# Patient Record
Sex: FEMALE | Race: BLACK OR AFRICAN AMERICAN | Employment: FULL TIME | URBAN - METROPOLITAN AREA
[De-identification: names, ages, dates, MRNs, and addresses within clinical notes are randomized per-mention and may not be internally consistent; named-entity substitution may affect disease eponyms.]

---

## 2017-09-15 ENCOUNTER — TELEPHONE (OUTPATIENT)
Dept: SURGERY | Age: 37
End: 2017-09-15

## 2017-09-15 ENCOUNTER — OFFICE VISIT (OUTPATIENT)
Dept: SURGERY | Age: 37
End: 2017-09-15

## 2017-09-15 VITALS
WEIGHT: 211 LBS | DIASTOLIC BLOOD PRESSURE: 78 MMHG | BODY MASS INDEX: 33.12 KG/M2 | SYSTOLIC BLOOD PRESSURE: 110 MMHG | HEIGHT: 67 IN

## 2017-09-15 PROCEDURE — 99202 OFFICE O/P NEW SF 15 MIN: CPT | Performed by: SURGERY

## 2017-09-15 ASSESSMENT — ENCOUNTER SYMPTOMS
COLOR CHANGE: 0
APNEA: 0
EYE DISCHARGE: 0
BACK PAIN: 0
EYE ITCHING: 0
ABDOMINAL PAIN: 0
ABDOMINAL DISTENTION: 0
CHEST TIGHTNESS: 0

## 2019-11-05 ENCOUNTER — HOSPITAL ENCOUNTER (EMERGENCY)
Age: 39
Discharge: HOME | End: 2019-11-05
Payer: COMMERCIAL

## 2019-11-05 ENCOUNTER — APPOINTMENT (OUTPATIENT)
Dept: CT IMAGING | Age: 39
End: 2019-11-05
Payer: COMMERCIAL

## 2019-11-05 VITALS
RESPIRATION RATE: 16 BRPM | SYSTOLIC BLOOD PRESSURE: 125 MMHG | BODY MASS INDEX: 28.56 KG/M2 | DIASTOLIC BLOOD PRESSURE: 83 MMHG | TEMPERATURE: 98.8 F | OXYGEN SATURATION: 98 % | WEIGHT: 182 LBS | HEART RATE: 91 BPM | HEIGHT: 67 IN

## 2019-11-05 DIAGNOSIS — S50.311A ABRASION OF RIGHT ELBOW, INITIAL ENCOUNTER: ICD-10-CM

## 2019-11-05 DIAGNOSIS — S00.83XA CONTUSION OF FACE, INITIAL ENCOUNTER: Primary | ICD-10-CM

## 2019-11-05 PROCEDURE — 6370000000 HC RX 637 (ALT 250 FOR IP): Performed by: PHYSICIAN ASSISTANT

## 2019-11-05 PROCEDURE — 70486 CT MAXILLOFACIAL W/O DYE: CPT

## 2019-11-05 PROCEDURE — 99284 EMERGENCY DEPT VISIT MOD MDM: CPT

## 2019-11-05 RX ORDER — ACETAMINOPHEN 500 MG
1000 TABLET ORAL ONCE
Status: COMPLETED | OUTPATIENT
Start: 2019-11-05 | End: 2019-11-05

## 2019-11-05 RX ORDER — NAPROXEN 500 MG/1
500 TABLET ORAL 2 TIMES DAILY
Qty: 20 TABLET | Refills: 0 | Status: SHIPPED | OUTPATIENT
Start: 2019-11-05 | End: 2022-07-25

## 2019-11-05 RX ORDER — IBUPROFEN 600 MG/1
600 TABLET ORAL ONCE
Status: COMPLETED | OUTPATIENT
Start: 2019-11-05 | End: 2019-11-05

## 2019-11-05 RX ADMIN — IBUPROFEN 600 MG: 600 TABLET ORAL at 19:30

## 2019-11-05 RX ADMIN — ACETAMINOPHEN 1000 MG: 500 TABLET, FILM COATED ORAL at 19:30

## 2019-11-05 ASSESSMENT — PAIN DESCRIPTION - LOCATION: LOCATION: HEAD;THROAT

## 2019-11-05 ASSESSMENT — PAIN DESCRIPTION - PAIN TYPE: TYPE: ACUTE PAIN

## 2019-11-05 ASSESSMENT — PAIN SCALES - GENERAL: PAINLEVEL_OUTOF10: 7

## 2020-03-07 ENCOUNTER — HOSPITAL ENCOUNTER (EMERGENCY)
Age: 40
Discharge: HOME | End: 2020-03-07
Payer: COMMERCIAL

## 2020-03-07 VITALS
HEART RATE: 88 BPM | SYSTOLIC BLOOD PRESSURE: 110 MMHG | OXYGEN SATURATION: 98 % | TEMPERATURE: 97.8 F | RESPIRATION RATE: 16 BRPM | DIASTOLIC BLOOD PRESSURE: 67 MMHG

## 2020-03-07 PROCEDURE — 96372 THER/PROPH/DIAG INJ SC/IM: CPT

## 2020-03-07 PROCEDURE — 6360000002 HC RX W HCPCS: Performed by: PHYSICIAN ASSISTANT

## 2020-03-07 PROCEDURE — 99282 EMERGENCY DEPT VISIT SF MDM: CPT

## 2020-03-07 PROCEDURE — 6370000000 HC RX 637 (ALT 250 FOR IP): Performed by: PHYSICIAN ASSISTANT

## 2020-03-07 RX ORDER — LIDOCAINE 4 G/G
1 PATCH TOPICAL ONCE
Status: DISCONTINUED | OUTPATIENT
Start: 2020-03-07 | End: 2020-03-07 | Stop reason: HOSPADM

## 2020-03-07 RX ORDER — CYCLOBENZAPRINE HCL 10 MG
10 TABLET ORAL NIGHTLY PRN
Qty: 10 TABLET | Refills: 0 | Status: SHIPPED | OUTPATIENT
Start: 2020-03-07 | End: 2020-03-17

## 2020-03-07 RX ORDER — PREDNISONE 20 MG/1
60 TABLET ORAL ONCE
Status: COMPLETED | OUTPATIENT
Start: 2020-03-07 | End: 2020-03-07

## 2020-03-07 RX ORDER — ORPHENADRINE CITRATE 30 MG/ML
60 INJECTION INTRAMUSCULAR; INTRAVENOUS ONCE
Status: COMPLETED | OUTPATIENT
Start: 2020-03-07 | End: 2020-03-07

## 2020-03-07 RX ORDER — PREDNISONE 10 MG/1
60 TABLET ORAL DAILY
Qty: 30 TABLET | Refills: 0 | Status: SHIPPED | OUTPATIENT
Start: 2020-03-07 | End: 2020-03-12

## 2020-03-07 RX ORDER — KETOROLAC TROMETHAMINE 30 MG/ML
60 INJECTION, SOLUTION INTRAMUSCULAR; INTRAVENOUS ONCE
Status: COMPLETED | OUTPATIENT
Start: 2020-03-07 | End: 2020-03-07

## 2020-03-07 RX ORDER — LIDOCAINE 4 G/G
1 PATCH TOPICAL DAILY
Status: DISCONTINUED | OUTPATIENT
Start: 2020-03-07 | End: 2020-03-07

## 2020-03-07 RX ORDER — NAPROXEN 500 MG/1
500 TABLET ORAL 2 TIMES DAILY WITH MEALS
Qty: 30 TABLET | Refills: 0 | Status: SHIPPED | OUTPATIENT
Start: 2020-03-07 | End: 2022-07-25

## 2020-03-07 RX ORDER — LIDOCAINE 50 MG/G
1 PATCH TOPICAL DAILY
Qty: 30 PATCH | Refills: 0 | Status: SHIPPED | OUTPATIENT
Start: 2020-03-07 | End: 2022-07-25

## 2020-03-07 RX ADMIN — PREDNISONE 60 MG: 20 TABLET ORAL at 06:28

## 2020-03-07 RX ADMIN — ORPHENADRINE CITRATE 60 MG: 30 INJECTION INTRAMUSCULAR; INTRAVENOUS at 06:29

## 2020-03-07 RX ADMIN — KETOROLAC TROMETHAMINE 60 MG: 30 INJECTION, SOLUTION INTRAMUSCULAR at 06:28

## 2020-03-07 ASSESSMENT — PAIN DESCRIPTION - FREQUENCY: FREQUENCY: CONTINUOUS

## 2020-03-07 ASSESSMENT — ENCOUNTER SYMPTOMS
NAUSEA: 0
DIARRHEA: 0
SHORTNESS OF BREATH: 0
ABDOMINAL PAIN: 0
BACK PAIN: 1
VOMITING: 0

## 2020-03-07 ASSESSMENT — PAIN SCALES - GENERAL
PAINLEVEL_OUTOF10: 7
PAINLEVEL_OUTOF10: 7
PAINLEVEL_OUTOF10: 3

## 2020-03-07 ASSESSMENT — PAIN DESCRIPTION - ORIENTATION: ORIENTATION: RIGHT

## 2020-03-07 ASSESSMENT — PAIN DESCRIPTION - DESCRIPTORS: DESCRIPTORS: ACHING

## 2020-03-07 ASSESSMENT — PAIN DESCRIPTION - LOCATION: LOCATION: BACK

## 2020-03-07 NOTE — ED PROVIDER NOTES
[facility]        Pt Name: Yoselyn Dash  MRN: [id number]  [place] 1980  Date of evaluation: 3/7/2020  Provider: Isidro Dewitt PA-C  PCP: Frank Diaz MD    Evaluation by LISANDRO. My supervising physician was available for consultation. CHIEF COMPLAINT       Chief Complaint   Patient presents with    Back Pain     pt to er with c/o R side back pain that radiates down to her foot. Pt denies any injury and states she hasn't tried any OTC medications for pain. HISTORY OF PRESENT ILLNESS   (Location, Timing/Onset, Context/Setting, Quality, Duration, Modifying Factors, Severity, Associated Signs and Symptoms)  Note limiting factors. Yoselyn Dash is a 46 y.o. female who presents to the emergency department today for evaluation for back pain. The patient states that she is been experiencing right-sided low back pain, and she states that this is radiating down her leg. The patient states that she is actually had the pain for 2 months, but she tells me that she is in the emergency room today because it was her day off. She tells me that her pain is otherwise not changed in 2 months. 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 She does report pain down her leg although she denies any bowel or bladder incontinence or retention. No saddle anesthesias. She has no numbness, tingling or weakness. She is currently rating her pain as a 3/10, pain is worse with touch and certain movements. She denies any fever chills per no nausea or vomiting. She denies any injections into the back. No dysuria hematuria.   No other complaints    Nursing Notes were all reviewed and agreed with or any disagreements were N/A    CONSULTS:  None      EMERGENCY DEPARTMENT COURSE and DIFFERENTIAL DIAGNOSIS/MDM:   Vitals:    Vitals:    03/07/20 0507 03/07/20 0621 03/07/20 0653   BP: 96/60  110/67   Pulse: 92  88   Resp: 14  16   Temp: 97.8 °F (36.6 °C)     TempSrc: Oral     SpO2:  99% 98%       Patient was given the following medications:  Medications   lidocaine 4 % external patch 1 patch (1 patch Transdermal Patch Applied 3/7/20 0650)   ketorolac (TORADOL) injection 60 mg (60 mg Intramuscular Given 3/7/20 0628)   orphenadrine (NORFLEX) injection 60 mg (60 mg Intramuscular Given 3/7/20 0629)   predniSONE (DELTASONE) tablet 60 mg (60 mg Oral Given 3/7/20 6394)       The patient presents to the emergency department today for evaluation for back pain. The patient states that she is been experiencing right-sided low back pain, and she states that this is radiating down her leg. The patient states that she is actually had the pain for 2 months, but she tells me that she is in the emergency room today because it was her day off. She tells me that her pain is otherwise not changed in 2 months. 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 She does report pain down her leg although she denies any bowel or bladder incontinence or retention. No saddle anesthesias. She has no numbness, tingling or weakness. She is currently rating her pain as a 3/10, pain is worse with touch and certain movements. She denies any fever chills per no nausea or vomiting. She denies any injections into the back. No dysuria hematuria. No other complaints    Physical exam, the patient does have tenderness to the paraspinous muscles of the right lower lumbar spine, there are no focal neurological deficits. There are no red flags.     As patient onto the skin daily 12 hours on, 12 hours off., Disp-30 patch, R-0Print             DISCONTINUED MEDICATIONS:  Discharge Medication List as of 3/7/2020  6:47 AM                 (Please note that portions of this note were completed with a voice recognition program.  Efforts were made to edit the dictations but occasionally words are mis-transcribed.)    Karina Welsh PA-C (electronically signed)            Karina Welsh PA-C  03/07/20 710

## 2020-12-11 VITALS
TEMPERATURE: 98 F | DIASTOLIC BLOOD PRESSURE: 73 MMHG | OXYGEN SATURATION: 95 % | WEIGHT: 192 LBS | BODY MASS INDEX: 30.13 KG/M2 | SYSTOLIC BLOOD PRESSURE: 110 MMHG | HEIGHT: 67 IN | RESPIRATION RATE: 18 BRPM | HEART RATE: 107 BPM

## 2020-12-11 PROCEDURE — 93005 ELECTROCARDIOGRAM TRACING: CPT | Performed by: EMERGENCY MEDICINE

## 2020-12-12 ENCOUNTER — APPOINTMENT (OUTPATIENT)
Dept: CT IMAGING | Age: 40
DRG: 134 | End: 2020-12-12
Payer: COMMERCIAL

## 2020-12-12 ENCOUNTER — HOSPITAL ENCOUNTER (EMERGENCY)
Age: 40
Discharge: LEFT BEFORE DISCHARGE | DRG: 134 | End: 2020-12-12
Payer: COMMERCIAL

## 2020-12-12 ENCOUNTER — HOSPITAL ENCOUNTER (EMERGENCY)
Age: 40
Discharge: LEFT BEFORE DISCHARGE | DRG: 134 | End: 2020-12-12
Payer: COMMERCIAL

## 2020-12-12 ENCOUNTER — APPOINTMENT (OUTPATIENT)
Dept: GENERAL RADIOLOGY | Age: 40
DRG: 134 | End: 2020-12-12
Payer: COMMERCIAL

## 2020-12-12 VITALS
TEMPERATURE: 97.7 F | DIASTOLIC BLOOD PRESSURE: 83 MMHG | HEART RATE: 86 BPM | OXYGEN SATURATION: 97 % | SYSTOLIC BLOOD PRESSURE: 136 MMHG | WEIGHT: 192 LBS | HEIGHT: 67 IN | BODY MASS INDEX: 30.13 KG/M2 | RESPIRATION RATE: 18 BRPM

## 2020-12-12 LAB
A/G RATIO: 0.9 (ref 1.1–2.2)
ALBUMIN SERPL-MCNC: 3.9 G/DL (ref 3.4–5)
ALP BLD-CCNC: 88 U/L (ref 40–129)
ALT SERPL-CCNC: 15 U/L (ref 10–40)
ANION GAP SERPL CALCULATED.3IONS-SCNC: 10 MMOL/L (ref 3–16)
AST SERPL-CCNC: 25 U/L (ref 15–37)
BASOPHILS ABSOLUTE: 0 K/UL (ref 0–0.2)
BASOPHILS RELATIVE PERCENT: 0.6 %
BILIRUB SERPL-MCNC: 0.5 MG/DL (ref 0–1)
BUN BLDV-MCNC: 11 MG/DL (ref 7–20)
CALCIUM SERPL-MCNC: 9 MG/DL (ref 8.3–10.6)
CHLORIDE BLD-SCNC: 109 MMOL/L (ref 99–110)
CO2: 22 MMOL/L (ref 21–32)
CREAT SERPL-MCNC: 0.8 MG/DL (ref 0.6–1.1)
EKG ATRIAL RATE: 104 BPM
EKG DIAGNOSIS: NORMAL
EKG P AXIS: 55 DEGREES
EKG P-R INTERVAL: 158 MS
EKG Q-T INTERVAL: 386 MS
EKG QRS DURATION: 84 MS
EKG QTC CALCULATION (BAZETT): 507 MS
EKG R AXIS: 4 DEGREES
EKG T AXIS: 25 DEGREES
EKG VENTRICULAR RATE: 104 BPM
EOSINOPHILS ABSOLUTE: 0.2 K/UL (ref 0–0.6)
EOSINOPHILS RELATIVE PERCENT: 3.9 %
GFR AFRICAN AMERICAN: >60
GFR NON-AFRICAN AMERICAN: >60
GLOBULIN: 4.2 G/DL
GLUCOSE BLD-MCNC: 90 MG/DL (ref 70–99)
HCG QUALITATIVE: NEGATIVE
HCT VFR BLD CALC: 39.4 % (ref 36–48)
HEMOGLOBIN: 12.9 G/DL (ref 12–16)
LYMPHOCYTES ABSOLUTE: 1.5 K/UL (ref 1–5.1)
LYMPHOCYTES RELATIVE PERCENT: 27.7 %
MCH RBC QN AUTO: 31.1 PG (ref 26–34)
MCHC RBC AUTO-ENTMCNC: 32.7 G/DL (ref 31–36)
MCV RBC AUTO: 94.9 FL (ref 80–100)
MONOCYTES ABSOLUTE: 0.6 K/UL (ref 0–1.3)
MONOCYTES RELATIVE PERCENT: 11.2 %
NEUTROPHILS ABSOLUTE: 3.1 K/UL (ref 1.7–7.7)
NEUTROPHILS RELATIVE PERCENT: 56.6 %
PDW BLD-RTO: 16 % (ref 12.4–15.4)
PLATELET # BLD: 156 K/UL (ref 135–450)
PMV BLD AUTO: 7.2 FL (ref 5–10.5)
POTASSIUM SERPL-SCNC: 4.2 MMOL/L (ref 3.5–5.1)
PRO-BNP: 35 PG/ML (ref 0–124)
RBC # BLD: 4.16 M/UL (ref 4–5.2)
SODIUM BLD-SCNC: 141 MMOL/L (ref 136–145)
TOTAL PROTEIN: 8.1 G/DL (ref 6.4–8.2)
TROPONIN: <0.01 NG/ML
WBC # BLD: 5.5 K/UL (ref 4–11)

## 2020-12-12 PROCEDURE — 84703 CHORIONIC GONADOTROPIN ASSAY: CPT

## 2020-12-12 PROCEDURE — 93010 ELECTROCARDIOGRAM REPORT: CPT | Performed by: INTERNAL MEDICINE

## 2020-12-12 PROCEDURE — 85025 COMPLETE CBC W/AUTO DIFF WBC: CPT

## 2020-12-12 PROCEDURE — 6370000000 HC RX 637 (ALT 250 FOR IP): Performed by: PHYSICIAN ASSISTANT

## 2020-12-12 PROCEDURE — 94640 AIRWAY INHALATION TREATMENT: CPT

## 2020-12-12 PROCEDURE — 71260 CT THORAX DX C+: CPT

## 2020-12-12 PROCEDURE — 80053 COMPREHEN METABOLIC PANEL: CPT

## 2020-12-12 PROCEDURE — 99283 EMERGENCY DEPT VISIT LOW MDM: CPT

## 2020-12-12 PROCEDURE — 6360000004 HC RX CONTRAST MEDICATION: Performed by: PHYSICIAN ASSISTANT

## 2020-12-12 PROCEDURE — 94760 N-INVAS EAR/PLS OXIMETRY 1: CPT

## 2020-12-12 PROCEDURE — 84484 ASSAY OF TROPONIN QUANT: CPT

## 2020-12-12 PROCEDURE — 93005 ELECTROCARDIOGRAM TRACING: CPT

## 2020-12-12 PROCEDURE — 83880 ASSAY OF NATRIURETIC PEPTIDE: CPT

## 2020-12-12 RX ORDER — IPRATROPIUM BROMIDE AND ALBUTEROL SULFATE 2.5; .5 MG/3ML; MG/3ML
1 SOLUTION RESPIRATORY (INHALATION) ONCE
Status: COMPLETED | OUTPATIENT
Start: 2020-12-12 | End: 2020-12-12

## 2020-12-12 RX ADMIN — IPRATROPIUM BROMIDE AND ALBUTEROL SULFATE 1 AMPULE: .5; 3 SOLUTION RESPIRATORY (INHALATION) at 19:57

## 2020-12-12 RX ADMIN — IOPAMIDOL 75 ML: 755 INJECTION, SOLUTION INTRAVENOUS at 18:46

## 2020-12-12 ASSESSMENT — ENCOUNTER SYMPTOMS
RHINORRHEA: 0
COUGH: 0
VOMITING: 0
ABDOMINAL PAIN: 0
NAUSEA: 0
DIARRHEA: 0
SHORTNESS OF BREATH: 1

## 2020-12-12 NOTE — ED PROVIDER NOTES
This is an independent LISANDRO patient encounter. I am available for consultation if needed. I did not perform a face-to-face evaluation of this patient and was not asked to see the patient. I was not made aware of any details of the patient's H&P or medical decision making but was asked to review and document this EKG. See my interpretation of the EKG below. I shared my findings and interpretation with the LISANDRO for use in his/her independent management of this patient. See his/her note for details of the patient's history, physical, and all medical decision making.       The 12 lead EKG was interpreted by me as follows:  Rate: normal with a rate of 88  Rhythm: sinus  Axis: normal  Intervals: long QTc, narrow QRS  ST segments: no ST elevations or depressions  T waves: no abnormal inversions  Non-specific T wave changes: not present  Prior EKG comparison: EKG dated 12/11/20 is not significantly different          Parish Prince MD  12/12/20 9343

## 2020-12-13 ENCOUNTER — HOSPITAL ENCOUNTER (INPATIENT)
Age: 40
LOS: 1 days | Discharge: HOME | DRG: 134 | End: 2020-12-14
Attending: EMERGENCY MEDICINE | Admitting: INTERNAL MEDICINE
Payer: COMMERCIAL

## 2020-12-13 PROBLEM — I26.09 ACUTE PULMONARY EMBOLISM WITH ACUTE COR PULMONALE (HCC): Status: ACTIVE | Noted: 2020-12-13

## 2020-12-13 LAB
A/G RATIO: 1 (ref 1.1–2.2)
ALBUMIN SERPL-MCNC: 3.8 G/DL (ref 3.4–5)
ALP BLD-CCNC: 91 U/L (ref 40–129)
ALT SERPL-CCNC: 15 U/L (ref 10–40)
ANION GAP SERPL CALCULATED.3IONS-SCNC: 9 MMOL/L (ref 3–16)
APTT: 141.6 SEC (ref 24.2–36.2)
APTT: 28.7 SEC (ref 24.2–36.2)
APTT: 79.8 SEC (ref 24.2–36.2)
AST SERPL-CCNC: 24 U/L (ref 15–37)
BASOPHILS ABSOLUTE: 0 K/UL (ref 0–0.2)
BASOPHILS RELATIVE PERCENT: 0.6 %
BILIRUB SERPL-MCNC: 0.7 MG/DL (ref 0–1)
BUN BLDV-MCNC: 11 MG/DL (ref 7–20)
CALCIUM SERPL-MCNC: 8.7 MG/DL (ref 8.3–10.6)
CHLORIDE BLD-SCNC: 105 MMOL/L (ref 99–110)
CO2: 24 MMOL/L (ref 21–32)
CREAT SERPL-MCNC: 0.9 MG/DL (ref 0.6–1.1)
EKG ATRIAL RATE: 88 BPM
EKG ATRIAL RATE: 98 BPM
EKG DIAGNOSIS: NORMAL
EKG DIAGNOSIS: NORMAL
EKG P AXIS: 50 DEGREES
EKG P AXIS: 58 DEGREES
EKG P-R INTERVAL: 158 MS
EKG P-R INTERVAL: 176 MS
EKG Q-T INTERVAL: 390 MS
EKG Q-T INTERVAL: 428 MS
EKG QRS DURATION: 78 MS
EKG QRS DURATION: 80 MS
EKG QTC CALCULATION (BAZETT): 497 MS
EKG QTC CALCULATION (BAZETT): 517 MS
EKG R AXIS: 10 DEGREES
EKG R AXIS: 14 DEGREES
EKG T AXIS: 32 DEGREES
EKG T AXIS: 37 DEGREES
EKG VENTRICULAR RATE: 88 BPM
EKG VENTRICULAR RATE: 98 BPM
EOSINOPHILS ABSOLUTE: 0.1 K/UL (ref 0–0.6)
EOSINOPHILS RELATIVE PERCENT: 3.4 %
GFR AFRICAN AMERICAN: >60
GFR NON-AFRICAN AMERICAN: >60
GLOBULIN: 3.9 G/DL
GLUCOSE BLD-MCNC: 122 MG/DL (ref 70–99)
HCT VFR BLD CALC: 38.5 % (ref 36–48)
HEMOGLOBIN: 12.8 G/DL (ref 12–16)
INR BLD: 1.08 (ref 0.86–1.14)
LYMPHOCYTES ABSOLUTE: 1.2 K/UL (ref 1–5.1)
LYMPHOCYTES RELATIVE PERCENT: 29.8 %
MCH RBC QN AUTO: 31.2 PG (ref 26–34)
MCHC RBC AUTO-ENTMCNC: 33.3 G/DL (ref 31–36)
MCV RBC AUTO: 93.8 FL (ref 80–100)
MONOCYTES ABSOLUTE: 0.4 K/UL (ref 0–1.3)
MONOCYTES RELATIVE PERCENT: 9.1 %
NEUTROPHILS ABSOLUTE: 2.3 K/UL (ref 1.7–7.7)
NEUTROPHILS RELATIVE PERCENT: 57.1 %
PDW BLD-RTO: 15.8 % (ref 12.4–15.4)
PLATELET # BLD: 159 K/UL (ref 135–450)
PMV BLD AUTO: 7.4 FL (ref 5–10.5)
POTASSIUM REFLEX MAGNESIUM: 3.6 MMOL/L (ref 3.5–5.1)
PRO-BNP: 43 PG/ML (ref 0–124)
PROTHROMBIN TIME: 12.5 SEC (ref 10–13.2)
RBC # BLD: 4.1 M/UL (ref 4–5.2)
SODIUM BLD-SCNC: 138 MMOL/L (ref 136–145)
TOTAL PROTEIN: 7.7 G/DL (ref 6.4–8.2)
TROPONIN: <0.01 NG/ML
WBC # BLD: 4 K/UL (ref 4–11)

## 2020-12-13 PROCEDURE — 85730 THROMBOPLASTIN TIME PARTIAL: CPT

## 2020-12-13 PROCEDURE — 81241 F5 GENE: CPT

## 2020-12-13 PROCEDURE — 86147 CARDIOLIPIN ANTIBODY EA IG: CPT

## 2020-12-13 PROCEDURE — 81240 F2 GENE: CPT

## 2020-12-13 PROCEDURE — 93010 ELECTROCARDIOGRAM REPORT: CPT | Performed by: INTERNAL MEDICINE

## 2020-12-13 PROCEDURE — 83880 ASSAY OF NATRIURETIC PEPTIDE: CPT

## 2020-12-13 PROCEDURE — 36415 COLL VENOUS BLD VENIPUNCTURE: CPT

## 2020-12-13 PROCEDURE — 85025 COMPLETE CBC W/AUTO DIFF WBC: CPT

## 2020-12-13 PROCEDURE — 85610 PROTHROMBIN TIME: CPT

## 2020-12-13 PROCEDURE — 80053 COMPREHEN METABOLIC PANEL: CPT

## 2020-12-13 PROCEDURE — 6360000002 HC RX W HCPCS: Performed by: PHYSICIAN ASSISTANT

## 2020-12-13 PROCEDURE — 84484 ASSAY OF TROPONIN QUANT: CPT

## 2020-12-13 PROCEDURE — 96375 TX/PRO/DX INJ NEW DRUG ADDON: CPT

## 2020-12-13 PROCEDURE — 96366 THER/PROPH/DIAG IV INF ADDON: CPT

## 2020-12-13 PROCEDURE — 96365 THER/PROPH/DIAG IV INF INIT: CPT

## 2020-12-13 PROCEDURE — 99283 EMERGENCY DEPT VISIT LOW MDM: CPT

## 2020-12-13 PROCEDURE — 93005 ELECTROCARDIOGRAM TRACING: CPT | Performed by: PHYSICIAN ASSISTANT

## 2020-12-13 PROCEDURE — 2580000003 HC RX 258: Performed by: PHYSICIAN ASSISTANT

## 2020-12-13 PROCEDURE — 2140000000 HC CCU INTERMEDIATE R&B

## 2020-12-13 PROCEDURE — 6370000000 HC RX 637 (ALT 250 FOR IP): Performed by: INTERNAL MEDICINE

## 2020-12-13 RX ORDER — ACETAMINOPHEN 650 MG/1
650 SUPPOSITORY RECTAL EVERY 6 HOURS PRN
Status: DISCONTINUED | OUTPATIENT
Start: 2020-12-13 | End: 2020-12-14 | Stop reason: HOSPADM

## 2020-12-13 RX ORDER — SODIUM CHLORIDE 0.9 % (FLUSH) 0.9 %
10 SYRINGE (ML) INJECTION PRN
Status: DISCONTINUED | OUTPATIENT
Start: 2020-12-13 | End: 2020-12-14 | Stop reason: HOSPADM

## 2020-12-13 RX ORDER — PROMETHAZINE HYDROCHLORIDE 25 MG/1
12.5 TABLET ORAL EVERY 6 HOURS PRN
Status: DISCONTINUED | OUTPATIENT
Start: 2020-12-13 | End: 2020-12-14 | Stop reason: SDUPTHER

## 2020-12-13 RX ORDER — HEPARIN SODIUM 1000 [USP'U]/ML
80 INJECTION, SOLUTION INTRAVENOUS; SUBCUTANEOUS ONCE
Status: COMPLETED | OUTPATIENT
Start: 2020-12-13 | End: 2020-12-13

## 2020-12-13 RX ORDER — PROMETHAZINE HYDROCHLORIDE 25 MG/1
12.5 TABLET ORAL EVERY 6 HOURS PRN
Status: DISCONTINUED | OUTPATIENT
Start: 2020-12-13 | End: 2020-12-14 | Stop reason: HOSPADM

## 2020-12-13 RX ORDER — ONDANSETRON 2 MG/ML
4 INJECTION INTRAMUSCULAR; INTRAVENOUS EVERY 6 HOURS PRN
Status: DISCONTINUED | OUTPATIENT
Start: 2020-12-13 | End: 2020-12-14 | Stop reason: SDUPTHER

## 2020-12-13 RX ORDER — HEPARIN SODIUM 10000 [USP'U]/100ML
18 INJECTION, SOLUTION INTRAVENOUS CONTINUOUS
Status: DISCONTINUED | OUTPATIENT
Start: 2020-12-13 | End: 2020-12-14 | Stop reason: ALTCHOICE

## 2020-12-13 RX ORDER — HEPARIN SODIUM 1000 [USP'U]/ML
40 INJECTION, SOLUTION INTRAVENOUS; SUBCUTANEOUS PRN
Status: DISCONTINUED | OUTPATIENT
Start: 2020-12-13 | End: 2020-12-14 | Stop reason: HOSPADM

## 2020-12-13 RX ORDER — ACETAMINOPHEN 325 MG/1
650 TABLET ORAL EVERY 6 HOURS PRN
Status: DISCONTINUED | OUTPATIENT
Start: 2020-12-13 | End: 2020-12-14 | Stop reason: SDUPTHER

## 2020-12-13 RX ORDER — 0.9 % SODIUM CHLORIDE 0.9 %
1000 INTRAVENOUS SOLUTION INTRAVENOUS ONCE
Status: COMPLETED | OUTPATIENT
Start: 2020-12-13 | End: 2020-12-13

## 2020-12-13 RX ORDER — SODIUM CHLORIDE 0.9 % (FLUSH) 0.9 %
10 SYRINGE (ML) INJECTION EVERY 12 HOURS SCHEDULED
Status: DISCONTINUED | OUTPATIENT
Start: 2020-12-13 | End: 2020-12-14 | Stop reason: HOSPADM

## 2020-12-13 RX ORDER — HEPARIN SODIUM 1000 [USP'U]/ML
80 INJECTION, SOLUTION INTRAVENOUS; SUBCUTANEOUS PRN
Status: DISCONTINUED | OUTPATIENT
Start: 2020-12-13 | End: 2020-12-14 | Stop reason: HOSPADM

## 2020-12-13 RX ORDER — POLYETHYLENE GLYCOL 3350 17 G/17G
17 POWDER, FOR SOLUTION ORAL DAILY PRN
Status: DISCONTINUED | OUTPATIENT
Start: 2020-12-13 | End: 2020-12-14 | Stop reason: SDUPTHER

## 2020-12-13 RX ORDER — POLYETHYLENE GLYCOL 3350 17 G/17G
17 POWDER, FOR SOLUTION ORAL DAILY PRN
Status: DISCONTINUED | OUTPATIENT
Start: 2020-12-13 | End: 2020-12-14 | Stop reason: HOSPADM

## 2020-12-13 RX ORDER — ONDANSETRON 2 MG/ML
4 INJECTION INTRAMUSCULAR; INTRAVENOUS EVERY 6 HOURS PRN
Status: DISCONTINUED | OUTPATIENT
Start: 2020-12-13 | End: 2020-12-14 | Stop reason: HOSPADM

## 2020-12-13 RX ORDER — ACETAMINOPHEN 650 MG/1
650 SUPPOSITORY RECTAL EVERY 6 HOURS PRN
Status: DISCONTINUED | OUTPATIENT
Start: 2020-12-13 | End: 2020-12-14 | Stop reason: SDUPTHER

## 2020-12-13 RX ORDER — ACETAMINOPHEN 325 MG/1
650 TABLET ORAL EVERY 6 HOURS PRN
Status: DISCONTINUED | OUTPATIENT
Start: 2020-12-13 | End: 2020-12-14 | Stop reason: HOSPADM

## 2020-12-13 RX ADMIN — HEPARIN SODIUM 6970 UNITS: 1000 INJECTION INTRAVENOUS; SUBCUTANEOUS at 10:07

## 2020-12-13 RX ADMIN — ACETAMINOPHEN 650 MG: 325 TABLET ORAL at 22:23

## 2020-12-13 RX ADMIN — HEPARIN SODIUM 18 UNITS/KG/HR: 10000 INJECTION, SOLUTION INTRAVENOUS at 10:07

## 2020-12-13 RX ADMIN — SODIUM CHLORIDE 1000 ML: 9 INJECTION, SOLUTION INTRAVENOUS at 12:03

## 2020-12-13 ASSESSMENT — ENCOUNTER SYMPTOMS
COUGH: 1
BACK PAIN: 0
SHORTNESS OF BREATH: 1
DIARRHEA: 0
COLOR CHANGE: 0
NAUSEA: 0
ABDOMINAL PAIN: 0
CONSTIPATION: 0
CHEST TIGHTNESS: 0
VOMITING: 0

## 2020-12-13 ASSESSMENT — PAIN SCALES - GENERAL
PAINLEVEL_OUTOF10: 0
PAINLEVEL_OUTOF10: 0
PAINLEVEL_OUTOF10: 7
PAINLEVEL_OUTOF10: 0

## 2020-12-13 NOTE — ED PROVIDER NOTES
I independently performed a history and physical on [id number] [place] [address]. All diagnostic, treatment, and disposition decisions were made by myself in conjunction with the advanced practice provider. Briefly, this is a 45 y.o. female here for pulmonary embolism. The patient has developed shortness of breath and fatigue on ambulation. Initially thought it was Covid. Evaluation yesterday, she was diagnosed with multiple pulmonary emboli. She could not stay because she had to arrange for care for her children. She left AMA and came back this morning for admission. Shortness of breath persist, particularly with ambulation or exertion. .    On exam, patient appears mildly short of breath with tachypnea and short phrase dyspnea. Lungs are clear to auscultation bilaterally. Heart is tachycardic, but regular. EKG  The Ekg interpreted by me in the absence of a cardiologist shows. normal sinus rhythm with a rate of 98  Axis is   Normal  QTc is  prolonged  Intervals and Durations are unremarkable. No specific ST-T wave changes appreciated. No evidence of acute ischemia. No significant change from prior EKG dated 12/11/2020    MDM  Patient appeared stable on considered discharging her home on anticoagulation given the pandemic so she could avoid the risk of being in the hospital.  However, when he measured her vitals on ambulation, the patient developed tachycardia to 128 as well as ventral hypoxia into the low 80s. She became very winded and weak as well. This indicates her clot burden is causing some respiratory compromise. At this time I do not believe she is stable for discharge home. FINAL IMPRESSION  1. Other acute pulmonary embolism without acute cor pulmonale ([address]    2. Dyspnea on exertion    3. Hypoxia        Blood pressure (!) 155/83, pulse 98, temperature 98.1 °F (36.7 °C), temperature source Oral, resp.  rate 22, height 5' 7\" (1.702 m), weight 192 lb (87.1 kg), last menstrual period 11/22/2020, SpO2 (!) 84 %.      For further details of [facility] emergency department encounter, please see documentation by advanced practice provider, KERI Hirsch.         Karel Mahan MD  12/13/20 5475

## 2020-12-13 NOTE — PROGRESS NOTES
Patient admit to the CVU, placed on the tele monitor. Patient in bed, on oxygen sat 99%. She denies any needs at this time.

## 2020-12-13 NOTE — ED NOTES
Pt remains resting in bed at this time, no acute sign of distress. Continues on cardiac monitor.       Bernie Hernandez RN  85/18/88 3584

## 2020-12-13 NOTE — PROGRESS NOTES
4 Eyes Skin Assessment     NAME:  Ru Graham  YOB: 1980  MEDICAL RECORD NUMBER:  [id number]    The patient is being assess for  Admission    I agree that 2 RN's have performed a thorough Head to Toe Skin Assessment on the patient. ALL assessment sites listed below have been assessed. Areas assessed by both nurses:    Head, Face, Ears, Shoulders, Back, Chest, Arms, Elbows, Hands, Sacrum. Buttock, Coccyx, Ischium and Legs. Feet and Heels        Does the Patient have a Wound?  No noted wound(s)       Avni Prevention initiated:  Yes   Wound Care Orders initiated:  NA    Pressure Injury (Stage 3,4, Unstageable, DTI, NWPT, and Complex wounds) if present place consult order under [de-identified] NA    New and Established Ostomies if present place consult order under : NA      Nurse 1 eSignature: Electronically signed by Bettie Foster RN on 12/13/20 at 4:14 PM EST    **SHARE this note so that the co-signing nurse is able to place an eSignature**    Nurse 2 eSignature: Electronically signed by Shasta Hester RN on 12/13/20 at 4:15 PM EST

## 2020-12-13 NOTE — ED PROVIDER NOTES
[facility] [facility]        Pt Name: Torres Kumar  MRN: [id number]  [place] 1980  Date of evaluation: 12/12/2020  Provider: Magdalene Stafford PA-C  PCP: Sara Gooden MD    LISANDRO. I have evaluated this patient. My supervising physician was available for consultation. CHIEF COMPLAINT       Chief Complaint   Patient presents with    Shortness of Breath     Pt presents to the ED with SOB, dizziness aand feeling weak for the last week        HISTORY OF PRESENT ILLNESS   (Location, Timing/Onset, Context/Setting, Quality, Duration, Modifying Factors, Severity, Associated Signs and Symptoms)  Note limiting factors. Torres Kumar is a 45 y.o. female who presents to the emergency department today for evaluation for shortness of breath. The patient states over the past week she has been experiencing shortness of breath, lightheadedness, particularly with ambulation. She states that overall her symptoms have been worsening, and she states that now even from walking short distances she will have increasing shortness of breath. The patient states that she does not have any chest pain. She does not have any fever or chills. She denies any cough or congestion. She denies any abdominal pain, nausea, vomiting or diarrhea. No urinary symptoms. The patient denies any lower leg pain or swelling. She denies any recent travels, immobilizations or surgeries. She has no history of DVT or PE. She states that she does have an IUD in place and this was actually renewed 1 week ago. Patient denies any family history of any cardiac events. She denies any history of hypertension diabetes or hyperlipidemia. Non-smoker. No other complaints at this time. Nursing Notes were all reviewed and agreed with or any disagreements were addressed in the HPI.     REVIEW OF SYSTEMS    (2-9 systems for level 4, 10 or more for level 5)     Review of Systems [place] [address] [address], [address]   Phone [phone]       All other labs were within normal range or not returned as of this dictation. EKG: All EKG's are interpreted by the Emergency Department Physician in the absence of a cardiologist.  Please see their note for interpretation of EKG. RADIOLOGY:   Non-plain film images such as CT, Ultrasound and MRI are read by the radiologist. Plain radiographic images are visualized and preliminarily interpreted by the ED Provider with the below findings:        Interpretation per the Radiologist below, if available at the time of this note:    CT CHEST PULMONARY EMBOLISM W CONTRAST   Final Result   Pulmonary emboli affecting all lobes. No saddle embolus. No radiologic   findings to suggest right ventricular strain. Critical results were called by Dr. Kori Portillo MD to [facility] on   12/12/2020 at 19:38. Xr Chest Portable    Result Date: 12/12/2020  EXAMINATION: ONE XRAY VIEW OF THE CHEST 12/12/2020 12:12 am COMPARISON: May 22, 2015 HISTORY: ORDERING SYSTEM PROVIDED HISTORY: SOB TECHNOLOGIST PROVIDED HISTORY: Reason for exam:->SOB FINDINGS: No lines or tubes. Normal cardiomediastinal silhouette. The lungs are clear without focal consolidation or pleural effusion. No suspicious pulmonary nodules. No pulmonary edema. No pneumothorax. No acute osseous abnormality. No acute cardiopulmonary disease. Ct Chest Pulmonary Embolism W Contrast    Result Date: 12/12/2020  EXAMINATION: CTA OF THE CHEST 12/12/2020 6:47 pm TECHNIQUE: CTA of the chest was performed after the administration of intravenous contrast.  Multiplanar reformatted images are provided for review. MIP images are provided for review. Dose modulation, iterative reconstruction, and/or weight based adjustment of the mA/kV was utilized to reduce the radiation dose to as low as reasonably achievable. COMPARISON: None.  HISTORY: ORDERING SYSTEM PROVIDED HISTORY: r/o pe TECHNOLOGIST PROVIDED HISTORY: Reason for exam:->r/o pe Reason for Exam: Shortness of Breath (Pt presents to the ED with SOB, dizziness aand feeling weak for the last week ). R/o PE. Acuity: Acute Type of Exam: Initial FINDINGS: Pulmonary Arteries: Main pulmonary artery is normal in caliber. Large filling defect within the distal right main pulmonary artery extending into right lobar, right middle lobar, and right lower lobar pulmonary arteries. There also filling defect within multiple segmental pulmonary arteries in the right lower lobe and right upper lobe. Filling defect within a segmental pulmonary artery supplying the left upper lobe anteriorly. Filling defects within segmental and subsegmental pulmonary arteries in the left lower lobe and lingula. No radiologic findings to suggest right ventricular strain. Mediastinum: No evidence of mediastinal lymphadenopathy. The heart and pericardium demonstrate no acute abnormality. There is no acute abnormality of the thoracic aorta. Lungs/pleura: The lungs are without acute process. No focal consolidation or pulmonary edema. No evidence of pleural effusion or pneumothorax. Upper Abdomen: Limited images of the upper abdomen are unremarkable. Soft Tissues/Bones: Bilateral breast implants. No acute bony abnormality. Pulmonary emboli affecting all lobes. No saddle embolus. No radiologic findings to suggest right ventricular strain. Critical results were called by Dr. Corinne Gamez MD to [facility] on 12/12/2020 at 19:38.            PROCEDURES   Unless otherwise noted below, none     Procedures    CRITICAL CARE TIME   N/A    CONSULTS:  None      EMERGENCY DEPARTMENT COURSE and DIFFERENTIAL DIAGNOSIS/MDM:   Vitals:    Vitals:    12/12/20 1613 12/12/20 1800 12/12/20 2000   BP: 138/83 136/83    Pulse: 88 86    Resp: 22 24 18   Temp: 97.7 °F (36.5 °C)     TempSrc: Oral     SpO2: 99% 99% 97%   Weight: 192 lb (87.1 kg)     Height: 5' 7\" (1.702 m)         Patient was given the following medications:  Medications   ipratropium-albuterol (DUONEB) nebulizer solution 1 ampule (1 ampule Inhalation Given 12/12/20 1957)   iopamidol (ISOVUE-370) 76 % injection 75 mL (75 mLs Intravenous Given 12/12/20 1846)           Briefly , this is a 67-year-old female, previously healthy who presents to the emergency department today for evaluation for exertional shortness of breath, and lightheadedness x1 week. Non-smoker. Patient states that she had her IUD replaced 1 week ago. The patient tells me that \"I feel like I have asthma but I do not think that I have asthma\". She was given a breathing treatment    On physical exam, the patient is tachypneic. Lungs are clear to auscultation bilaterally    Her EKG is documented by Dr. Catie Judd, please see his note for further details. Lab work in the ED is unremarkable, negative troponin. CT of chest was obtained and does show to have pulmonary emboli affecting all lobes, no saddle embolus, no ventricular strain. Patient was made aware of these results, I recommended admission, as well as heparin treatment. The patient states that she needs to leave so she can go drop her kids off. The patient states that she has to drop her kids off at her mother's house, and her mother cannot drive. She states that she will come right back to the hospital.  The patient does understand the risks of signing out AMA including permanent disability and even death. She is alert and oriented x3, she is capable of making this decision. Patient states that she will sign out AMA but she states that she will come right back to the hospital.  No other treatment will be given as she states that she is coming right back. To sign AMA. FINAL IMPRESSION      1. Other acute pulmonary embolism without acute cor pulmonale ([address]    2.  GREGORIO (dyspnea on exertion)          DISPOSITION/PLAN   DISPOSITION [place] 12/12/2020 07:46:17 PM      PATIENT REFERREDTO:  Flores Ojeda MD  [address]  [address]  [phone]    Schedule an appointment as soon as possible for a visit in 1 day      [facility] Emergency Department  [address] [place] [address]  [phone]    As needed, If symptoms worsen      DISCHARGE MEDICATIONS:  New Prescriptions    No medications on file       DISCONTINUED MEDICATIONS:  Discontinued Medications    No medications on file              (Please note that portions of this note were completed with a voice recognition program.  Efforts were made to edit the dictations but occasionally words are mis-transcribed.)    Jenny Reynoso PA-C (electronically signed)            Jenny Reynoso PA-C  12/12/20 6928

## 2020-12-13 NOTE — ED NOTES
Pt ambulated with portable pulse ox. Highest heart rate was 128, highest O2 was 97%. Lowest heart rate was 114, lowest O2 was 95%.      Austin Calhoun  12/13/20 1148

## 2020-12-13 NOTE — ED NOTES
This writing RN attempted IV access X2, unsuccessful, another nurse to try at this time.       Stefanie Mustafa, [id number]  65/19/00 2098 Mixed hyperlipidemia

## 2020-12-13 NOTE — ED PROVIDER NOTES
[address] [address]        Pt Name: Tra Rowe  MRN: [id number]  [place] 1980  Date of evaluation: 12/13/2020  Provider: KERI Antunez  PCP: No primary care provider on file. I have seen and evaluated this patient with my supervising physician [id number] [address]       Chief Complaint   Patient presents with    Pulmonary Embolism     pt signed out AMA last night- was dx with multiple blood clots in lungs- came back to be admitted. HISTORY OF PRESENT ILLNESS   (Location, Timing/Onset, Context/Setting, Quality, Duration, Modifying Factors, Severity, Associated Signs and Symptoms)  Note limiting factors. Tra Rowe is a 45 y.o. female with no significant past medical history who presents to the ED with known blood clots in her lungs. Patient that she was seen here last night and she signed out [address] States she had to go home and get stuff taken care of with her kids. States she presented this morning for admission. Patient states she was diagnosed with multiple PEs yesterday. Patient that she had shortness of breath for the past week and a half. Patient states she thought she had Covid but states she has had multiple Covid test that were negative. Patient states she has dry cough with shortness of breath worsened with exertion. Denies chest pain, pleuritic pain, orthopnea, pedal edema or calf tenderness. Denies fever chills. Denies rashes or lesions. Denies abdominal pain, nausea/vomiting, urinary symptoms or changes in bowel movements. Patient states her sister did have blood clots secondary to birth control. States she had an IUD placed about 2 weeks ago. Denies any recent travel, trips, surgery or immobilization. Denies any other history of blood clots. Denies any blood thinning medication usage. Nursing Notes were all reviewed and agreed with or any disagreements were addressed in the HPI.     REVIEW OF SYSTEMS    (2-9 systems for level 4, 10 or more for level 5)     Review of Systems   Constitutional: Negative for activity change, appetite change, chills, diaphoresis, fatigue and fever. Respiratory: Positive for cough and shortness of breath. Negative for chest tightness. Cardiovascular: Negative. Negative for chest pain, palpitations and leg swelling. Gastrointestinal: Negative for abdominal pain, constipation, diarrhea, nausea and vomiting. Genitourinary: Negative for decreased urine volume, difficulty urinating, dysuria, flank pain, frequency, hematuria and urgency. Musculoskeletal: Negative for arthralgias, back pain, myalgias, neck pain and neck stiffness. Skin: Negative for color change, pallor, rash and wound. Neurological: Negative for dizziness, light-headedness and headaches. Positives and Pertinent negatives as per HPI. Except as noted above in the ROS, all other systems were reviewed and negative. PAST MEDICAL HISTORY   History reviewed. No pertinent past medical history. SURGICAL HISTORY     Past Surgical History:   Procedure Laterality Date    ABDOMINOPLASTY      BREAST ENHANCEMENT SURGERY      HERNIA REPAIR      abd         CURRENTMEDICATIONS       Current Discharge Medication List      CONTINUE these medications which have NOT CHANGED    Details   naproxen (NAPROSYN) 500 MG tablet Take 1 tablet by mouth 2 times daily (with meals)  Qty: 30 tablet, Refills: 0      lidocaine (LIDODERM) 5 % Place 1 patch onto the skin daily 12 hours on, 12 hours off. Qty: 30 patch, Refills: 0               ALLERGIES     Patient has no known allergies. FAMILYHISTORY     History reviewed. No pertinent family history.        SOCIAL HISTORY       Social History     Tobacco Use    Smoking status: Never Smoker    Smokeless tobacco: Never Used   Substance Use Topics    Alcohol use: Yes     Comment: occasional    Drug use: Not Currently       SCREENINGS    [place] Coma Scale  Eye Opening: Spontaneous  Best Verbal Response: Oriented  Best Motor Response: Obeys commands  [place] Coma Scale Score: 15        PHYSICAL EXAM    (up to 7 for level 4, 8 or more for level 5)     ED Triage Vitals [12/13/20 0900]   BP Temp Temp Source Pulse Resp SpO2 Height Weight   (!) 152/99 98.1 °F (36.7 °C) Oral 92 22 96 % 5' 7\" (1.702 m) 192 lb (87.1 kg)       Physical Exam  Constitutional:       General: She is not in acute distress. Appearance: Normal appearance. She is well-developed. She is not ill-appearing, toxic-appearing or diaphoretic. HENT:      Head: Normocephalic and atraumatic. Right Ear: External ear normal.      Left Ear: External ear normal.   Eyes:      General:         Right eye: No discharge. Left eye: No discharge. Neck:      Musculoskeletal: Normal range of motion and neck supple. Cardiovascular:      Rate and Rhythm: Normal rate and regular rhythm. Pulses: Normal pulses. Heart sounds: Normal heart sounds. No murmur. No friction rub. No gallop. Comments: 2+ radial pulses bilaterally. No pedal edema. No calf tenderness. No JVD. Pulmonary:      Effort: Pulmonary effort is normal. No respiratory distress. Breath sounds: Normal breath sounds. No stridor. No wheezing, rhonchi or rales. Chest:      Chest wall: No tenderness. Abdominal:      General: Abdomen is flat. There is no distension. Palpations: Abdomen is soft. There is no mass. Tenderness: There is no abdominal tenderness. There is no right CVA tenderness, left CVA tenderness, guarding or rebound. Hernia: No hernia is present. Musculoskeletal: Normal range of motion. Skin:     General: Skin is warm and dry. Coloration: Skin is not pale. Findings: No erythema or rash. Neurological:      Mental Status: She is alert and oriented to person, place, and time.    Psychiatric:         Behavior: Behavior normal.         DIAGNOSTIC RESULTS   LABS:    Labs Reviewed   CBC WITH AUTO DIFFERENTIAL - Abnormal; Notable for the following components:       Result Value    RDW 15.8 (*)     All other components within normal limits    Narrative:     Performed at:  [facility] [address],  [place]   Phone [phone]   COMPREHENSIVE METABOLIC PANEL W/ REFLEX TO MG FOR LOW K - Abnormal; Notable for the following components:    Glucose 122 (*)     Albumin/Globulin Ratio 1.0 (*)     All other components within normal limits    Narrative:     Performed at:  [facility] [address] [employer]   Phone [phone]   APTT - Abnormal; Notable for the following components:    aPTT 141.6 (*)     All other components within normal limits    Narrative:     CALL  [facility] tel. [phone],  Coag PTT results called to and read back by Ree Lara, 12/13/2020 16:32,  by CONNOR  Performed at:  [facility] [address]  [employer]   Phone [phone]   TROPONIN    Narrative:     Performed at:  [facility] [address] [employer]   Phone [phone] PEPTIDE    Narrative:     Performed at:  [facility] [address] [employer]   Phone [phone]   PROTIME-INR    Narrative:     Performed at:  [facility] [address]  [employer]   Phone [phone]   APTT    Narrative:     Performed at:  [facility] [address] [employer]   Phone [phone]   FACTOR [address] LEID[address]    Narrative:     Referred out by:  [facility] [address]  [employer]   Phone [phone]   APTT   PROTHROMBIN GENE MUTATION   CARDIOLIPIN ANTIBODIES IGG & IGM   CARDIOLIPIN ANTIBODY, IGA   APTT       All other labs were within normal range or not returned as of this dictation. EKG: All EKG's are interpreted by the Emergency Department Physician in the absence of a cardiologist.  Please see their note for interpretation of EKG. RADIOLOGY:   Non-plain film images such as CT, Ultrasound and MRI are read by the radiologist. Plain radiographic images are visualized and preliminarily interpreted by the ED Provider with the below findings:        Interpretation per the Radiologist below, if available at the time of this note:    No orders to display     Xr Chest Portable    Result Date: 12/12/2020  EXAMINATION: [address] 12/12/2020 12:12 am COMPARISON: May 22, 2015 HISTORY: ORDERING SYSTEM PROVIDED HISTORY: SOB TECHNOLOGIST PROVIDED HISTORY: Reason for exam:->SOB FINDINGS: No lines or tubes. Normal cardiomediastinal silhouette. The lungs are clear without focal consolidation or pleural effusion. No suspicious pulmonary nodules. No pulmonary edema. No pneumothorax. No acute osseous abnormality. No acute cardiopulmonary disease. Ct Chest Pulmonary Embolism W Contrast    Result Date: 12/12/2020  EXAMINATION: CTA OF THE CHEST 12/12/2020 6:47 pm TECHNIQUE: CTA of the chest was performed after the administration of intravenous contrast.  Multiplanar reformatted images are provided for review. MIP images are provided for review. Dose modulation, iterative reconstruction, and/or weight based adjustment of the mA/kV was utilized to reduce the radiation dose to as low as reasonably achievable. COMPARISON: None. HISTORY: ORDERING SYSTEM PROVIDED HISTORY: r/o pe TECHNOLOGIST PROVIDED HISTORY: Reason for exam:->r/o pe Reason for Exam: Shortness of Breath (Pt presents to the ED with SOB, dizziness aand feeling weak for the last week ). R/o PE. Acuity: Acute Type of Exam: Initial FINDINGS: Pulmonary Arteries: Main pulmonary artery is normal in caliber.   Large filling defect within the distal right main pulmonary artery extending into right lobar, right middle lobar, and right lower lobar pulmonary arteries. There also filling defect within multiple segmental pulmonary arteries in the right lower lobe and right upper lobe. Filling defect within a segmental pulmonary artery supplying the left upper lobe anteriorly. Filling defects within segmental and subsegmental pulmonary arteries in the left lower lobe and lingula. No radiologic findings to suggest right ventricular strain. Mediastinum: No evidence of mediastinal lymphadenopathy. The heart and pericardium demonstrate no acute abnormality. There is no acute abnormality of the thoracic aorta. Lungs/pleura: The lungs are without acute process. No focal consolidation or pulmonary edema. No evidence of pleural effusion or pneumothorax. Upper Abdomen: Limited images of the upper abdomen are unremarkable. Soft Tissues/Bones: Bilateral breast implants. No acute bony abnormality. Pulmonary emboli affecting all lobes. No saddle embolus. No radiologic findings to suggest right ventricular strain. Critical results were called by Dr. Marleen Napier MD to [facility] on 12/12/2020 at 19:38. PROCEDURES   Unless otherwise noted below, none     Procedures    CRITICAL CARE TIME   The total critical care time spent while evaluating and treating this patient was 33 minutes. This excludes time spent doing separately billable procedures. This includes time at the bedside, data interpretation, medication management, obtaining critical history from collateral sources if the patient is unable to provide it directly, and physician consultation. Specifics of interventions taken and potentially life-threatening diagnostic considerations are listed above in the medical decision making.         CONSULTS:  IP CONSULT TO HEM/ONC  IP CONSULT TO OB GYN      EMERGENCY DEPARTMENT COURSE and DIFFERENTIAL DIAGNOSIS/MDM:   Vitals:    Vitals:    12/13/20 1230 12/13/20 1515 12/13/20 1555 12/13/20 1600 BP: 109/89 (!) 109/97  124/73   Pulse: 92 87  85   Resp:    22   Temp:  98.5 °F (36.9 °C)     TempSrc:  Temporal     SpO2: 98% 100%     Weight:   225 lb 1.4 oz (102.1 kg)    Height:           Patient was given the following medications:  Medications   heparin (porcine) injection 6,970 Units (has no administration in time range)   heparin (porcine) injection 3,480 Units (has no administration in time range)   heparin 25,000 units in dextrose 5% 250 mL infusion (0 Units/kg/hr × 87.1 kg Intravenous Paused 12/13/20 1630)   perflutren lipid microspheres (DEFINITY) injection 1.65 mg (has no administration in time range)   sodium chloride flush 0.9 % injection 10 mL (has no administration in time range)   sodium chloride flush 0.9 % injection 10 mL (has no administration in time range)   promethazine (PHENERGAN) tablet 12.5 mg (has no administration in time range)     Or   ondansetron (ZOFRAN) injection 4 mg (has no administration in time range)   polyethylene glycol (GLYCOLAX) packet 17 g (has no administration in time range)   acetaminophen (TYLENOL) tablet 650 mg (has no administration in time range)     Or   acetaminophen (TYLENOL) suppository 650 mg (has no administration in time range)   sodium chloride flush 0.9 % injection 10 mL (has no administration in time range)   sodium chloride flush 0.9 % injection 10 mL (has no administration in time range)   promethazine (PHENERGAN) tablet 12.5 mg (has no administration in time range)     Or   ondansetron (ZOFRAN) injection 4 mg (has no administration in time range)   polyethylene glycol (GLYCOLAX) packet 17 g (has no administration in time range)   acetaminophen (TYLENOL) tablet 650 mg (has no administration in time range)     Or   acetaminophen (TYLENOL) suppository 650 mg (has no administration in time range)   influenza quadrivalent split vaccine (FLUZONE;FLUARIX;FLULAVAL;AFLURIA) injection 0.5 mL (has no administration in time range)   heparin (porcine) injection 6,970 Units (6,970 Units Intravenous Given 12/13/20 1007)   0.9 % sodium chloride bolus (0 mLs Intravenous Stopped 12/13/20 1330)           Patient is a 50-year-old female who presents to the ED with complaint of shortness of breath. Complaint of shortness of breath worsened with exertion and nonproductive cough. Patient states she thought she had COVID-19 but had multiple negative Covid test. Came to the emergency department yesterday and had CT of the chest which showed pulmonary embolism. Patient was offered admission but she signed out against medical vascular she had to perform some tasks at home. Presents to the ED today for admission for pulmonary embolism. Patient states continued shortness of breath with exertion. Denies any pain. Afebrile with stable vital signs upon arrival. CBC showed normal white count, hemoglobin and platelets. Troponin normal. BNP 43. CMP relatively unremarkable. Coags obtained. Pregnancy negative yesterday. EKG interpreted by attending. CT reviewed from yesterday and showed multiple pulmonary embolism. Patient was placed on heparin here in the ED. Case discussed with hospitalist. Hospitalist states given the fact that patient presented yesterday and remains with stable vital signs here in the ED would like to discuss potential discharge home on Eliquis. Discussed this with attending and sounded like reasonable plan. Prior to discharge patient attempted to ambulate given the fact that she has shortness of breath worsened with exertion. Upon ambulation heart rate jumped into the 120s and became hypoxic on ambulation. Given this obviously do not believe safe for discharge home. We discussed with hospitalist service who graciously agreed to accept patient for admission at this time. Patient admitted in stable condition. FINAL IMPRESSION      1. Other acute pulmonary embolism without acute cor pulmonale ([address]    2. Dyspnea on exertion    3.  Hypoxia          DISPOSITION/PLAN DISPOSITION Admitted 12/13/2020 01:10:23 PM      PATIENT REFERREDTO:  No follow-up provider specified.     DISCHARGE MEDICATIONS:  Current Discharge Medication List          DISCONTINUED MEDICATIONS:  Current Discharge Medication List                 (Please note that portions of this note were completed with a voice recognition program.  Efforts were made to edit the dictations but occasionally words are mis-transcribed.)    KERI George (electronically signed)          [place], PA  12/13/20 6765

## 2020-12-13 NOTE — ED NOTES
Report to [facility], nurse resuming care of patient. No questions or concerns at this time.       Yeni Delgado RN  57/32/22 8112

## 2020-12-13 NOTE — H&P
Medications on File Prior to Encounter   Medication Sig Dispense Refill    naproxen (NAPROSYN) 500 MG tablet Take 1 tablet by mouth 2 times daily (with meals) 30 tablet 0    lidocaine (LIDODERM) 5 % Place 1 patch onto the skin daily 12 hours on, 12 hours off. 30 patch 0    naproxen (NAPROSYN) 500 MG tablet Take 1 tablet by mouth 2 times daily for 20 doses 20 tablet 0       Allergies:  No Known Allergies     Social History:  Patient Lives at home   reports that she has never smoked. She has never used smokeless tobacco. She reports current alcohol use. She reports previous drug use. Family History:  Sister with PE    Physical Exam:  BP (!) 140/49   Pulse 98   Temp 98.1 °F (36.7 °C) (Oral)   Resp 22   Ht 5' 7\" (1.702 m)   Wt 192 lb (87.1 kg)   LMP 11/22/2020   SpO2 98%   BMI 30.07 kg/m²     General appearance:  Appears comfortable. AAOx3  HEENT: atraumatic, Pupils equal, muscous membranes moist, no masses appreciated  Cardiovascular: Regular rate and rhythm no murmurs appreciated  Respiratory: CTAB no wheezing  Gastrointestinal: Abdomen soft, non-tender, BS+  EXT: no edema  Neurology: no gross focal deficts  Psychiatry: Appropriate affect. Not agitated  Skin: Warm, dry, no rashes appreciated    Labs:  CBC:   Lab Results   Component Value Date    WBC 4.0 12/13/2020    RBC 4.10 12/13/2020    HGB 12.8 12/13/2020    HCT 38.5 12/13/2020    MCV 93.8 12/13/2020    MCH 31.2 12/13/2020    MCHC 33.3 12/13/2020    RDW 15.8 12/13/2020     12/13/2020    MPV 7.4 12/13/2020     BMP:    Lab Results   Component Value Date     12/13/2020    K 3.6 12/13/2020     12/13/2020    CO2 24 12/13/2020    BUN 11 12/13/2020    CREATININE 0.9 12/13/2020    CALCIUM 8.7 12/13/2020    GFRAA >60 12/13/2020    LABGLOM >60 12/13/2020    GLUCOSE 122 12/13/2020     No orders to display       Recent imaging reviewed    Problem List  Active Problems:    * No active hospital problems.  *  Resolved Problems:    * No resolved hospital problems. *        Assessment/Plan:   Acute PE:   - patient tachycardic and hypxic with exertion,   = start on heparin gtt  - echo   - heme consult for possible hypercoag work up given family hx          DVT prophylaxis heparin gtt  Code status full code        Admit as inpatient I anticipate hospitalization spanning more than two midnights for investigation and treatment of the above medically necessary diagnoses. Please note that some part of this chart was generated using Dragon dictation software. Although every effort was made to ensure the accuracy of this automated transcription, some errors in transcription may have occurred inadvertently. If you may need any clarification, please do not hesitate to contact me through [facility].        Shona Osgood, MD    12/13/2020 12:21 PM

## 2020-12-13 NOTE — CONSULTS
[facility]   Consult Note      Reason for Consult:  Pulmonary embolism    Requesting Physician:  Dr. Gallo Kimble:  SOB    History Obtained From: patient    HISTORY OF PRESENT ILLNESS:      61-year-old lady without any significant past medical history presented to the hospital with 3 days history of shortness of breath. Next    CTPA showed pulmonary emboli affecting all the lobes. No saddle embolism noted. There was no radiologic findings suggestive of right ventricular strain. The patient was started on IV heparin. Patient has Geraldean Cave placed a couple of weeks ago. She does not report any other risk factors-long distance drive, recent immobilization or hospitalization, etc.  Patient's sister also had IUD placed 10 years back after which she developed a blood clot. No headaches or dizziness. No anorexia nausea vomiting or weight loss. No excessive fatigue or tiredness. No swelling in the legs. No history of external bleeding in the next. Past Medical History:     has no past medical history on file.    Past Surgical History:    Past Surgical History:   Procedure Laterality Date    ABDOMINOPLASTY      BREAST ENHANCEMENT SURGERY      HERNIA REPAIR      abd      Current Medications:    Current Facility-Administered Medications   Medication Dose Route Frequency Provider Last Rate Last Admin    heparin (porcine) injection 6,970 Units  80 Units/kg Intravenous PRN KERI Dawn        heparin (porcine) injection 3,480 Units  40 Units/kg Intravenous PRN KERI Dawn        heparin 25,000 units in dextrose 5% 250 mL infusion  18 Units/kg/hr Intravenous Continuous KERI Dawn 15.7 mL/hr at 12/13/20 1515 18 Units/kg/hr at 12/13/20 1515    perflutren lipid microspheres (DEFINITY) injection 1.65 mg  1.5 mL Intravenous ONCE PRN Gema Benitez MD        sodium chloride flush 0.9 % injection 10 mL  10 mL Intravenous 2 times per day Gema Benitez MD  sodium chloride flush 0.9 % injection 10 mL  10 mL Intravenous PRN Tabatha Ferrell MD        promethazine (PHENERGAN) tablet 12.5 mg  12.5 mg Oral Q6H PRN Tabatha Ferrell MD        Or    ondansetron (ZOFRAN) injection 4 mg  4 mg Intravenous Q6H PRN Tabatha Ferrell MD        polyethylene glycol (GLYCOLAX) packet 17 g  17 g Oral Daily PRN Tabatha Ferrell MD        acetaminophen (TYLENOL) tablet 650 mg  650 mg Oral Q6H PRN Tabatha Ferrell MD        Or   Ian Raphael acetaminophen (TYLENOL) suppository 650 mg  650 mg Rectal Q6H PRN Tabatha Ferrell MD        sodium chloride flush 0.9 % injection 10 mL  10 mL Intravenous 2 times per day Tabatha Ferrell MD        sodium chloride flush 0.9 % injection 10 mL  10 mL Intravenous PRN Tabatha Ferrell MD        promethazine (PHENERGAN) tablet 12.5 mg  12.5 mg Oral Q6H PRN Tabatha Ferrell MD        Or    ondansetron (ZOFRAN) injection 4 mg  4 mg Intravenous Q6H PRN Tabatha Ferrell MD        polyethylene glycol (GLYCOLAX) packet 17 g  17 g Oral Daily PRN Tabatha Ferrell MD        acetaminophen (TYLENOL) tablet 650 mg  650 mg Oral Q6H PRN Tabatha Ferrell MD        Or    acetaminophen (TYLENOL) suppository 650 mg  650 mg Rectal Q6H PRN Tabatha Ferrell MD         Allergies:    No Known Allergies   Social History:    reports that she has never smoked. She has never used smokeless tobacco. She reports current alcohol use. She reports previous drug use. Family History:     family history is not on file.      ·   ·     PHYSICAL EXAM:    Vitals:  Vitals:    12/13/20 1515   BP: (!) 109/97   Pulse: 87   Resp:    Temp: 98.5 °F (36.9 °C)   SpO2: 100%      CONSTITUTIONAL:  awake, alert, cooperative, no apparent distress  EYES:  pupils equal, round and reactive to light, sclera clear and conjunctiva normal  ENT:  normocepalic, without obvious abnormality, atramatic  NECK:  supple, symmetrical, no jugular venous distension   HEMATOLOGIC/LYMPHATICS:  No cervical,supraclavicular or axillary lymphadenopathy  LUNGS:  No increased work of breathing and clear to auscultation  CARDIOVASCULAR: Regular rate and rhythm, normal S1 and S2, no murmur noted  ABDOMEN:  Normal bowel sounds x 4, soft, non-distended, non-tender, no masses palpated, no hepatosplenomegally  MUSCULOSKELETAL:  full range of motion noted, tone is normal  EXTREMITIES: no LE edema  NEUROLOGIC:  Awake, alert, oriented to name, place and time. Motor skills grossly intact. SKIN:  normal skin color, texture, turgor and no jaundice. Appears intact. EXTREMITIES: No edema    DATA:  General Labs:    CBC:   Recent Labs     12/12/20  1801 12/13/20  0959   WBC 5.5 4.0   HGB 12.9 12.8   HCT 39.4 38.5   MCV 94.9 93.8    159     BMP:   Recent Labs     12/12/20  1801 12/13/20  0959    138   K 4.2 3.6    105   CO2 22 24   BUN 11 11   CREATININE 0.8 0.9     LIVER PROFILE:   Recent Labs     12/12/20  1801 12/13/20  0959   AST 25 24   ALT 15 15   BILITOT 0.5 0.7   ALKPHOS 88 91     PT/INR:    Lab Results   Component Value Date    PROTIME 12.5 12/13/2020    PROTIME 12.0 04/05/2019    INR 1.08 12/13/2020    INR 1.05 04/05/2019     PTT:    Lab Results   Component Value Date    APTT 28.7 12/13/2020     Magnesium:  No results found for: MG    Imaging:  Xr Chest Portable    Result Date: 12/12/2020  EXAMINATION: ONE XRAY VIEW OF THE CHEST 12/12/2020 12:12 am COMPARISON: May 22, 2015 HISTORY: ORDERING SYSTEM PROVIDED HISTORY: SOB TECHNOLOGIST PROVIDED HISTORY: Reason for exam:->SOB FINDINGS: No lines or tubes. Normal cardiomediastinal silhouette. The lungs are clear without focal consolidation or pleural effusion. No suspicious pulmonary nodules. No pulmonary edema. No pneumothorax. No acute osseous abnormality. No acute cardiopulmonary disease.      Ct Chest Pulmonary Embolism W Contrast    Result Date: 12/12/2020  EXAMINATION: CTA OF THE CHEST 12/12/2020 6:47 pm TECHNIQUE: CTA of the chest was performed after the administration of intravenous contrast.  Multiplanar reformatted images are provided for review. MIP images are provided for review. Dose modulation, iterative reconstruction, and/or weight based adjustment of the mA/kV was utilized to reduce the radiation dose to as low as reasonably achievable. COMPARISON: None. HISTORY: ORDERING SYSTEM PROVIDED HISTORY: r/o pe TECHNOLOGIST PROVIDED HISTORY: Reason for exam:->r/o pe Reason for Exam: Shortness of Breath (Pt presents to the ED with SOB, dizziness aand feeling weak for the last week ). R/o PE. Acuity: Acute Type of Exam: Initial FINDINGS: Pulmonary Arteries: Main pulmonary artery is normal in caliber. Large filling defect within the distal right main pulmonary artery extending into right lobar, right middle lobar, and right lower lobar pulmonary arteries. There also filling defect within multiple segmental pulmonary arteries in the right lower lobe and right upper lobe. Filling defect within a segmental pulmonary artery supplying the left upper lobe anteriorly. Filling defects within segmental and subsegmental pulmonary arteries in the left lower lobe and lingula. No radiologic findings to suggest right ventricular strain. Mediastinum: No evidence of mediastinal lymphadenopathy. The heart and pericardium demonstrate no acute abnormality. There is no acute abnormality of the thoracic aorta. Lungs/pleura: The lungs are without acute process. No focal consolidation or pulmonary edema. No evidence of pleural effusion or pneumothorax. Upper Abdomen: Limited images of the upper abdomen are unremarkable. Soft Tissues/Bones: Bilateral breast implants. No acute bony abnormality. Pulmonary emboli affecting all lobes. No saddle embolus. No radiologic findings to suggest right ventricular strain. Critical results were called by Dr. Anton Batista MD to [facility] on 12/12/2020 at 19:38.        Assessment & Plan:     78-year-old lady who is otherwise healthy has    1. Bilateral pulmonary embolism:    -Most likely provoked due to [place] IUD (Levonorgestrel 14.3 mcg/day)  -Patient is on IV heparin  -We will look into DOAC coverage.  - The patient's sister had DVT when she had IUD inserted 10 years back  -Will obtain limited hypercoagulable work-up  -Factor V Leiden mutation  -Prothrombin gene mutation  -Cardiolipin antibodies    -Would recommend removal of IUD and use alternative methods of nonhormonal contraception.  -Dr. Christina Radford will be consulting gynecologist.        I have discussed the above stated plan with the patient and they verbalized understanding and agreed with the plan. Thank you for allowing us to participate in this patients care.     Rock Powell MD  12/13/2020, 3:58 PM

## 2020-12-13 NOTE — PROGRESS NOTES
Up ad magdy to bathroom and back to bed without difficulty. .6. Heparin drip off for 1 hour and drip will be decreased by 6 units/kg/hr or to 12 units/kg/hr.

## 2020-12-13 NOTE — PLAN OF CARE
Pt's activity tolerance assessed each shift and as needed. Pt does not require stand by assistance with ambulation. Ambulates independently. Able to ambulate to restroom with little assistance. Will cont to monitor.

## 2020-12-13 NOTE — ED NOTES
At the end of ambulation pt sat on bed with pulse ox on and dropped to 83%.      Ruperto Garrido  12/13/20 1141

## 2020-12-13 NOTE — ED NOTES
Bed: 21  Expected date:   Expected time:   Means of arrival: Walk In  Comments:     Oral Ormond, RN  12/13/20 7170

## 2020-12-14 ENCOUNTER — APPOINTMENT (OUTPATIENT)
Dept: ULTRASOUND IMAGING | Age: 40
DRG: 134 | End: 2020-12-14
Payer: COMMERCIAL

## 2020-12-14 VITALS
TEMPERATURE: 98.4 F | HEART RATE: 112 BPM | BODY MASS INDEX: 35.47 KG/M2 | SYSTOLIC BLOOD PRESSURE: 122 MMHG | WEIGHT: 225.97 LBS | DIASTOLIC BLOOD PRESSURE: 65 MMHG | RESPIRATION RATE: 16 BRPM | HEIGHT: 67 IN | OXYGEN SATURATION: 97 %

## 2020-12-14 LAB
ANION GAP SERPL CALCULATED.3IONS-SCNC: 10 MMOL/L (ref 3–16)
APTT: 63.2 SEC (ref 24.2–36.2)
APTT: 64.8 SEC (ref 24.2–36.2)
BASOPHILS ABSOLUTE: 0 K/UL (ref 0–0.2)
BASOPHILS RELATIVE PERCENT: 0.7 %
BUN BLDV-MCNC: 8 MG/DL (ref 7–20)
CALCIUM SERPL-MCNC: 8.3 MG/DL (ref 8.3–10.6)
CHLORIDE BLD-SCNC: 109 MMOL/L (ref 99–110)
CO2: 18 MMOL/L (ref 21–32)
CREAT SERPL-MCNC: 0.6 MG/DL (ref 0.6–1.1)
EOSINOPHILS ABSOLUTE: 0.1 K/UL (ref 0–0.6)
EOSINOPHILS RELATIVE PERCENT: 4.9 %
GFR AFRICAN AMERICAN: >60
GFR NON-AFRICAN AMERICAN: >60
GLUCOSE BLD-MCNC: 88 MG/DL (ref 70–99)
HCT VFR BLD CALC: 36.1 % (ref 36–48)
HEMOGLOBIN: 12 G/DL (ref 12–16)
LV EF: 53 %
LVEF MODALITY: NORMAL
LYMPHOCYTES ABSOLUTE: 1.3 K/UL (ref 1–5.1)
LYMPHOCYTES RELATIVE PERCENT: 45.4 %
MCH RBC QN AUTO: 31.3 PG (ref 26–34)
MCHC RBC AUTO-ENTMCNC: 33.3 G/DL (ref 31–36)
MCV RBC AUTO: 94 FL (ref 80–100)
MONOCYTES ABSOLUTE: 0.3 K/UL (ref 0–1.3)
MONOCYTES RELATIVE PERCENT: 9.6 %
NEUTROPHILS ABSOLUTE: 1.1 K/UL (ref 1.7–7.7)
NEUTROPHILS RELATIVE PERCENT: 39.4 %
PDW BLD-RTO: 15.6 % (ref 12.4–15.4)
PLATELET # BLD: 126 K/UL (ref 135–450)
PMV BLD AUTO: 7.4 FL (ref 5–10.5)
POTASSIUM REFLEX MAGNESIUM: 3.8 MMOL/L (ref 3.5–5.1)
RBC # BLD: 3.85 M/UL (ref 4–5.2)
SODIUM BLD-SCNC: 137 MMOL/L (ref 136–145)
WBC # BLD: 2.8 K/UL (ref 4–11)

## 2020-12-14 PROCEDURE — 76830 TRANSVAGINAL US NON-OB: CPT

## 2020-12-14 PROCEDURE — 85025 COMPLETE CBC W/AUTO DIFF WBC: CPT

## 2020-12-14 PROCEDURE — 80048 BASIC METABOLIC PNL TOTAL CA: CPT

## 2020-12-14 PROCEDURE — 85730 THROMBOPLASTIN TIME PARTIAL: CPT

## 2020-12-14 PROCEDURE — 93306 TTE W/DOPPLER COMPLETE: CPT

## 2020-12-14 PROCEDURE — 6370000000 HC RX 637 (ALT 250 FOR IP): Performed by: INTERNAL MEDICINE

## 2020-12-14 RX ADMIN — APIXABAN 10 MG: 5 TABLET, FILM COATED ORAL at 19:00

## 2020-12-14 ASSESSMENT — PAIN SCALES - GENERAL
PAINLEVEL_OUTOF10: 0

## 2020-12-14 NOTE — PROGRESS NOTES
Hem/onc responded okay for patient to leave, has follow up appointment in place, pharmacy to dose eliquis.

## 2020-12-14 NOTE — PROGRESS NOTES
Spoke with the OB/GYN, will order pelvic ultrasound ( for IUD position). Will see the patient 12/14.

## 2020-12-14 NOTE — CONSULTS
Department of Gynecology  Consult Note      Reason for Consult:  Removal of Lynann Maya IUD secondary to pulmonary embolii    Requesting Physician:  Heriberto Rai COMPLAINT:   Shortness of breath    History obtained from patient    HISTORY OF PRESENT ILLNESS:     The patient is a 45 y.o. female  with no significant past medical history who presents with progressive shortness of breath for 1.5 weeks. Workup incudes a CT scan in ER  for pulmonary emboli and is currently treated with IV heparin. She relates a several month history of pain in right leg behind her knee which has recently subsided. She attributes the leg pain to varicose veins. 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 Admits to 18 lb weight gain this year. She has had Mirena IUD for 7 years and had it replaced with Lynann Maya IUD 2 weeks ago ( equivalent to [place]) . Her sister had a DVT age 33 while on [address] Her maternal aunt also had a DVT of unknown cause. She is currenlty sexually active and IUD is her contraception. She states she has a pap and STD testing 2 weeks ago which was normal with Dr Rell Rodriguez who also replaced the IUD. She currently denies any vaginal bleeding nor pelvic pain. Past Medical History:    History reviewed. No pertinent past medical history. Past Surgical History:        Procedure Laterality Date    ABDOMINOPLASTY      BREAST ENHANCEMENT SURGERY      HERNIA REPAIR      abd       Past Gynecological History:    1. Last menstrual period:  Not relavant  2. Menses: irregular, monthly with 2 d light bleeding with IUD  3. Contraception: Mirena IUD for 9 y which was replaced with Lynann Maya IUD 2 weeks ago.      meds:  Current Facility-Administered Medications:     heparin (porcine) injection 6,970 Units, 80 Units/kg, Intravenous, PRN, KERI Vasquez    heparin (porcine) injection 3,480 Units, 40 Units/kg, Intravenous, PRN, KERI Vasquez    heparin 25,000 units in dextrose 5% 250 mL infusion, 18 Units/kg/hr, Intravenous, Continuous, Cole De Leonma, Last Rate: 8.7 mL/hr at 12/14/20 0000, 10 Units/kg/hr at 12/14/20 0000    perflutren lipid microspheres (DEFINITY) injection 1.65 mg, 1.5 mL, Intravenous, ONCE PRN, Eugenio Belle MD    sodium chloride flush 0.9 % injection 10 mL, 10 mL, Intravenous, 2 times per day, Eugenio Belle MD    sodium chloride flush 0.9 % injection 10 mL, 10 mL, Intravenous, PRN, Eugenio Belle MD    promethazine (PHENERGAN) tablet 12.5 mg, 12.5 mg, Oral, Q6H PRN **OR** ondansetron (ZOFRAN) injection 4 mg, 4 mg, Intravenous, Q6H PRN, Eugenio Belle MD    polyethylene glycol (GLYCOLAX) packet 17 g, 17 g, Oral, Daily PRN, Eugenio Belle MD    acetaminophen (TYLENOL) tablet 650 mg, 650 mg, Oral, Q6H PRN, 650 mg at 12/13/20 2223 **OR** acetaminophen (TYLENOL) suppository 650 mg, 650 mg, Rectal, Q6H PRN, Eugenio Belle MD    sodium chloride flush 0.9 % injection 10 mL, 10 mL, Intravenous, 2 times per day, Eugenio Belle MD    sodium chloride flush 0.9 % injection 10 mL, 10 mL, Intravenous, PRN, Eugenio Belle MD    influenza quadrivalent split vaccine (FLUZONE;FLUARIX;FLULAVAL;AFLURIA) injection 0.5 mL, 0.5 mL, Intramuscular, Prior to discharge, Eugenio Belle MD       Allergies:  Patient has no known allergies. Social History:  TOBACCO:   reports that she has never smoked. She has never used smokeless tobacco.  ETOH:   reports current alcohol use. DRUGS:   reports previous drug use.     Family History:   See HPI   PHYSICAL EXAM:    Vitals:  /78   Pulse 90   Temp 98.3 °F (36.8 °C) (Temporal)   Resp 15   Ht 5' 7\" (1.702 m)   Wt 225 lb 15.5 oz (102.5 kg)   LMP 11/22/2020   SpO2 98%   BMI 35.39 kg/m²     CONSTITUTIONAL:  awake, alert, cooperative, no apparent distress, and appears stated age  NEUROLOGIC:  Mental Status Exam:  Orientation:   person, place, time  Memory:   normal  Fund of Knowledge:  normal  RIGHT LEG: no edema, redness, nontender      DATA:  Recent Labs     12/12/20  1801 12/13/20  0959 12/14/20  0600   WBC 5.5 4.0 2.8*   HGB 12.9 12.8 12.0   HCT 39.4 38.5 36.1    159 126*     Recent Labs     12/12/20  1801 12/13/20  0959 12/14/20  0600    138 137   K 4.2 3.6 3.8    105 109   CO2 22 24 18*   BUN 11 11 8   CREATININE 0.8 0.9 0.6   CALCIUM 9.0 8.7 8.3   AST 25 24  --    ALT 15 15  --    I  PELVIC USD: IUD is demonstrated within the endometrial cavity. 2.5 cm complex cystic lesion within the right ovary, for which pelvic   ultrasound in 6-12 weeks is suggested to ensure resolution.  Limited   evaluation of the left ovary is grossly unremarkable as above. IMPRESSION/RECOMMENDATIONS:      Acute pulmonary emboli with Liletta IUD:    Studies support no increased risk of thrombosis with IUD with progestins. She has had Mirena  IUD for 7 years and had it replaced with an equivalent IUD recently due to expiration. Her sister was using Nuvaring with estrogen/progestins and systemic exposure to hormones which is associated with thrombosis. She is in need of contraception as pregnancy would be a much worse case scenario and her bleeding will be much better controlled with her current IUD as compared to Paragard while anticoagulated . Replacing an IUD while anticoagulated also presents with risk of uterine perforation and significant bleeding complications. I discussed the scenarios with Ashley and she is comfortable retaining her current IUD with my recommendation. It is very unlikely the IUD is the cause of her current PE. Given her  [address] , searching for genetic reasons seems warranted. Doppler studies of her right leg could also be considered. Incidentally a 2.5 cm right ovarian cyst is noted. This is considered a functional cyst and of no clinical significance. Thank you for the consult. We will sign off and please recall if needed.

## 2020-12-14 NOTE — PROGRESS NOTES
Hem/onc has not responded at this time, Dr. Juan A Tran perfect served hem/onc as well with no response yet. Will call again.

## 2020-12-14 NOTE — CONSULTS
Pharmacy to check patient copays for Eliquis/Xarelto:    Copay for patient will be: $0/month      Pharmacy will continue to follow the decision for anticoagulation and  the patient if appropriate.        Ace Dalal, PharmD, [facility]  Clinical Pharmacist  [id number]

## 2020-12-14 NOTE — CARE COORDINATION
Discharge Planning Assessment                             Readmission risk score 7%  RN/SW discharge planner met with patient/ (and family member) to discuss reason for admission, current living situation, and potential needs at the time of discharge    Demographics/Insurance verified Yes [employer] insurnace, address verified     Current type of dwellin level home with basement    Patient from ECF/SW confirmed with: N/A    Living arrangements:with kids    Level of function/Support:independent, in school for LPN    PCP:none--CM provided listing    Last Visit to PCP:none    DME:none    Active with any community resources/agencies/skilled home care: none    Medication compliance issues:no concerns, uses the [facility] [employer] issues that could impact healthcare: [employer]      Tentative discharge plan: home    Discussed and provided facilities of choice if transition to a skilled nursing facility is required at the time of discharge      Discussed with patient and/or family that on the day of discharge home tentative time of discharge will be between 10 AM and noon.     Transportation at the time of discharge: [facility] has a ride    SHAHEED De LunaN, CCM, [place] [id number]  [phone]

## 2020-12-15 LAB
ANTICARDIOLIPIN IGA ANTIBODY: 2 APL (ref 0–11)
ANTICARDIOLIPIN IGG ANTIBODY: 4 GPL (ref 0–14)
CARDIOLIPIN AB IGM: 1 MPL (ref 0–12)

## 2020-12-15 NOTE — PROGRESS NOTES
[facility]   Progress Note      SUBJECTIVE:      Doing okay  No chest pain  Shortness of breath is present but better  No external bleeding. OBJECTIVE    Physical  VITALS:  /78   Pulse 90   Temp 98.3 °F (36.8 °C) (Temporal)   Resp 15   Ht 5' 7\" (1.702 m)   Wt 225 lb 15.5 oz (102.5 kg)   LMP 2020   SpO2 98%   BMI 35.39 kg/m²   TEMPERATURE:  Current - Temp: 98.3 °F (36.8 °C); Max - Temp  Av.1 °F (36.7 °C)  Min: 97.8 °F (36.6 °C)  Max: 98.3 °F (36.8 °C)  BLOOD PRESSURE RANGE:  Systolic (48EJO), DEX:760 , Min:102 , MKA:602   ; Diastolic (37IYW), QQW:11, Min:58, Max:78    24HR INTAKE/OUTPUT:      Intake/Output Summary (Last 24 hours) at 2020 1913  Last data filed at 2020 1525  Gross per 24 hour   Intake 600 ml   Output --   Net 600 ml       Conscious alert oriented. Appears comfortable. No neck fullness. Respiratory efforts are normal.    Abdomen is not distended. No leg edema    No focal deficits.     Data  Labs:  General Labs:  CBC with Differential:    Lab Results   Component Value Date    WBC 2.8 2020    RBC 3.85 2020    HGB 12.0 2020    HCT 36.1 2020     2020    MCV 94.0 2020    MCH 31.3 2020    MCHC 33.3 2020    RDW 15.6 2020    LYMPHOPCT 45.4 2020    MONOPCT 9.6 2020    BASOPCT 0.7 2020    MONOSABS 0.3 2020    LYMPHSABS 1.3 2020    EOSABS 0.1 2020    BASOSABS 0.0 2020     BMP:    Lab Results   Component Value Date     2020    K 3.8 2020     2020    CO2 18 2020    BUN 8 2020    LABALBU 3.8 2020    CREATININE 0.6 2020    CALCIUM 8.3 2020    GFRAA >60 2020    LABGLOM >60 2020    GLUCOSE 88 2020     Hepatic Function Panel:    Lab Results   Component Value Date    ALKPHOS 91 2020    ALT 15 2020    AST 24 2020    PROT 7.7 2020    BILITOT 0.7 2020 BILIDIR <0.2 05/22/2015    IBILI see below 05/22/2015    LABALBU 3.8 12/13/2020     LDH:  No results found for: LDH  PT/INR:    Lab Results   Component Value Date    PROTIME 12.5 12/13/2020    INR 1.08 12/13/2020     PTT:    Lab Results   Component Value Date    APTT 64.8 12/14/2020   [APTT    Current Medications  Current Facility-Administered Medications: apixaban (ELIQUIS) tablet 10 mg, 10 mg, Oral, BID **FOLLOWED BY** [START ON 12/21/2020] apixaban (ELIQUIS) tablet 5 mg, 5 mg, Oral, BID  heparin (porcine) injection 6,970 Units, 80 Units/kg, Intravenous, PRN  heparin (porcine) injection 3,480 Units, 40 Units/kg, Intravenous, PRN  perflutren lipid microspheres (DEFINITY) injection 1.65 mg, 1.5 mL, Intravenous, ONCE PRN  sodium chloride flush 0.9 % injection 10 mL, 10 mL, Intravenous, 2 times per day  sodium chloride flush 0.9 % injection 10 mL, 10 mL, Intravenous, PRN  promethazine (PHENERGAN) tablet 12.5 mg, 12.5 mg, Oral, Q6H PRN **OR** ondansetron (ZOFRAN) injection 4 mg, 4 mg, Intravenous, Q6H PRN  polyethylene glycol (GLYCOLAX) packet 17 g, 17 g, Oral, Daily PRN  acetaminophen (TYLENOL) tablet 650 mg, 650 mg, Oral, Q6H PRN **OR** acetaminophen (TYLENOL) suppository 650 mg, 650 mg, Rectal, Q6H PRN  sodium chloride flush 0.9 % injection 10 mL, 10 mL, Intravenous, 2 times per day  sodium chloride flush 0.9 % injection 10 mL, 10 mL, Intravenous, PRN    ASSESSMENT AND PLAN    71-year-old lady has    1. Bilateral pulmonary embolism:    -On IV heparin  -Okay to change over to Eliquis if insurance covers. -Hypercoagulable work-up pending  -Evaluated by Dr. Celestino Sawyer indication to change IUD.     Will ensure outpatient follow-up in 7 to 10 days      Yaakov Medina MD

## 2020-12-15 NOTE — PROGRESS NOTES
Data- discharge order received, pt verbalized agreement to discharge, disposition to previous residence, no needs for HHC/DME. Action- discharge instructions prepared and given to patient, pt verbalized understanding. Medication information packet given r/t NEW and/or CHANGED prescriptions emphasizing name/purpose/side effects, pt verbalized understanding. Discharge instruction summary: Diet- general, Activity- as tolerated, take periods of rest, Primary Care Physician as follows: No primary care provider on file. None f/u appointment list of PCP given to pt, pt to make F/U appointment with Hem/onc, number provide on discharge papers, this RN unable to make appointment for pt d/t office closed at this time, immunizations reviewed and pt refused flu vaccine, prescription medications filled at [address] for [employer], pt given evening dose before leaving. Response- Pt belongings gathered, IV removed. Disposition is home (no HHC/DME needs), transported by self, taken to lobby via w/c w/ RN, no complications.

## 2020-12-15 NOTE — PROGRESS NOTES
CLINICAL PHARMACY NOTE: MEDS TO [address] Select Patient?: No  Total # of Prescriptions Filled: 1   The following medications were delivered to the patient:  · eliquis starter pack  Total # of Interventions Completed: 0  Time Spent (min): 30    Additional Documentation:  Medication picked up by patient in [place] [address]

## 2020-12-19 LAB
FACTOR V LEIDEN: NEGATIVE
SPECIMEN: NORMAL

## 2020-12-20 NOTE — DISCHARGE SUMMARY
Hospital Medicine Discharge Summary    Patient ID: Jacinto Zelaya      Patient's PCP: No primary care provider on file. Admit Date: 12/13/2020     Discharge Date: 12/14/2020      Admitting Physician: Caren Nunez MD     Discharge Physician: Keren Perez MD     Discharge Diagnoses: Active Hospital Problems    Diagnosis    Acute pulmonary embolism with acute cor pulmonale (HCC) [I26.09]       The patient was seen and examined on day of discharge and this discharge summary is in conjunction with any daily progress note from day of discharge. Hospital Course:     Jacinto Zelaya is a 45 y.o. female who presented with 1` week of woresning dyspnea no chest pain nausea vomitting fever or chills. 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 CT pe done showed PE in ed. Patient denies any travel, pain or swell;ing in feet no hx of blood clots. Patient sister did have blood clot in the past. No other family members with clots. Prashanth didn have Mirena IUD placed 2 weeks ago but has been on it for over 5 years. OB/GYN consulted, do not believe IUD is the culprit-no plan for removal at this time. Heme-onc consulted and ordered hypercoagulable work-up. Patient stable and requests discharge given that she has important date in court regarding custody of her children. She will be discharged on Eliquis and follow-up with heme-onc regarding results of hypercoagulability work-up. Physical Exam Performed:     /65   Pulse 112   Temp 98.4 °F (36.9 °C) (Temporal)   Resp 16   Ht 5' 7\" (1.702 m)   Wt 225 lb 15.5 oz (102.5 kg)   LMP 11/22/2020   SpO2 97%   BMI 35.39 kg/m²       General appearance:  No apparent distress, appears stated age and cooperative. HEENT:  Normal cephalic, atraumatic without obvious deformity. Pupils equal, round, and reactive to light. Extra ocular muscles intact. Conjunctivae/corneas clear.   Neck: Supple, with full range of motion. No jugular venous distention. Trachea midline. Respiratory:  Normal respiratory effort. Clear to auscultation, bilaterally without Rales/Wheezes/Rhonchi. Cardiovascular:  Regular rate and rhythm with normal S1/S2 without murmurs, rubs or gallops. Abdomen: Soft, non-tender, non-distended with normal bowel sounds. Musculoskeletal:  No clubbing, cyanosis or edema bilaterally. Full range of motion without deformity. Skin: Skin color, texture, turgor normal.  No rashes or lesions. Neurologic:  Neurovascularly intact without any focal sensory/motor deficits. Cranial nerves: II-XII intact, grossly non-focal.  Psychiatric:  Alert and oriented, thought content appropriate, normal insight  Capillary Refill: Brisk,< 3 seconds   Peripheral Pulses: +2 palpable, equal bilaterally       Labs: For convenience and continuity at follow-up the following most recent labs are provided:      CBC:    Lab Results   Component Value Date    WBC 2.8 12/14/2020    HGB 12.0 12/14/2020    HCT 36.1 12/14/2020     12/14/2020       Renal:    Lab Results   Component Value Date     12/14/2020    K 3.8 12/14/2020     12/14/2020    CO2 18 12/14/2020    BUN 8 12/14/2020    CREATININE 0.6 12/14/2020    CALCIUM 8.3 12/14/2020         Significant Diagnostic Studies    Radiology:   US NON OB TRANSVAGINAL   Final Result   IUD is demonstrated within the endometrial cavity. 2.5 cm complex cystic lesion within the right ovary, for which pelvic   ultrasound in 6-12 weeks is suggested to ensure resolution. Limited   evaluation of the left ovary is grossly unremarkable as above.                 Consults:     IP CONSULT TO HEM/ONC  IP CONSULT TO OB GYN  IP CONSULT TO PHARMACY    Disposition: Home    Condition at Discharge: Stable    Discharge Instructions/Follow-up: PCP  Hematology oncology    Code Status:  Prior     Activity: activity as tolerated    Diet: regular diet      Discharge Medications:     Discharge Medication List as of 12/14/2020  6:47 PM           Details   !! apixaban (ELIQUIS) 5 MG TABS tablet Take 2 tablets by mouth 2 times daily, Disp-60 tablet, R-0Normal      !! apixaban (ELIQUIS) 5 MG TABS tablet Take 1 tablet by mouth 2 times daily, Disp-60 tablet, R-0Normal       !! - Potential duplicate medications found. Please discuss with provider. Details   naproxen (NAPROSYN) 500 MG tablet Take 1 tablet by mouth 2 times daily (with meals), Disp-30 tablet, R-0Print      lidocaine (LIDODERM) 5 % Place 1 patch onto the skin daily 12 hours on, 12 hours off., Disp-30 patch, R-0Print             Time Spent on discharge is more than 30 minutes in the examination, evaluation, counseling and review of medications and discharge plan.       Signed:    Electronically signed by Mukul Carter MD on 12/20/2020 at 4:17 PM

## 2020-12-21 LAB
PROTHROMBIN G20210A MUTATION: NEGATIVE
PT PCR SPECIMEN: NORMAL

## 2022-06-22 ENCOUNTER — TELEPHONE (OUTPATIENT)
Dept: BARIATRICS/WEIGHT MGMT | Age: 42
End: 2022-06-22

## 2022-06-22 NOTE — TELEPHONE ENCOUNTER
Patient was sent Dr. Imtiaz Gary digital bariatric seminar. Patient DOES have BWLS coverage with [employer] (6mo diet) Bariatric benefit form scanned in media.     *Spoke with patient, Info given  No HX of WLS, BMI > 35 (39 - 40 per patient)   pk mailed

## 2022-07-25 ENCOUNTER — OFFICE VISIT (OUTPATIENT)
Dept: FAMILY MEDICINE CLINIC | Age: 42
End: 2022-07-25
Payer: COMMERCIAL

## 2022-07-25 VITALS
SYSTOLIC BLOOD PRESSURE: 110 MMHG | HEIGHT: 67 IN | OXYGEN SATURATION: 96 % | HEART RATE: 103 BPM | WEIGHT: 246.2 LBS | TEMPERATURE: 97.9 F | BODY MASS INDEX: 38.64 KG/M2 | DIASTOLIC BLOOD PRESSURE: 70 MMHG

## 2022-07-25 DIAGNOSIS — R63.5 WEIGHT GAIN: ICD-10-CM

## 2022-07-25 DIAGNOSIS — E66.9 OBESITY, CLASS II, BMI 35-39.9, NO COMORBIDITY: ICD-10-CM

## 2022-07-25 DIAGNOSIS — Z00.00 ROUTINE ADULT HEALTH MAINTENANCE: Primary | ICD-10-CM

## 2022-07-25 PROCEDURE — 99386 PREV VISIT NEW AGE 40-64: CPT | Performed by: FAMILY MEDICINE

## 2022-07-25 RX ORDER — COPPER 313.4 MG/1
1 INTRAUTERINE DEVICE INTRAUTERINE CONTINUOUS
COMMUNITY
Start: 2022-02-23 | End: 2022-08-24

## 2022-07-25 SDOH — ECONOMIC STABILITY: FOOD INSECURITY: WITHIN THE PAST 12 MONTHS, THE FOOD YOU BOUGHT JUST DIDN'T LAST AND YOU DIDN'T HAVE MONEY TO GET MORE.: NEVER TRUE

## 2022-07-25 SDOH — ECONOMIC STABILITY: FOOD INSECURITY: WITHIN THE PAST 12 MONTHS, YOU WORRIED THAT YOUR FOOD WOULD RUN OUT BEFORE YOU GOT MONEY TO BUY MORE.: NEVER TRUE

## 2022-07-25 ASSESSMENT — PATIENT HEALTH QUESTIONNAIRE - PHQ9
SUM OF ALL RESPONSES TO PHQ QUESTIONS 1-9: 0
2. FEELING DOWN, DEPRESSED OR HOPELESS: 0
SUM OF ALL RESPONSES TO PHQ QUESTIONS 1-9: 0
1. LITTLE INTEREST OR PLEASURE IN DOING THINGS: 0
SUM OF ALL RESPONSES TO PHQ QUESTIONS 1-9: 0
SUM OF ALL RESPONSES TO PHQ9 QUESTIONS 1 & 2: 0
SUM OF ALL RESPONSES TO PHQ QUESTIONS 1-9: 0

## 2022-07-25 ASSESSMENT — SOCIAL DETERMINANTS OF HEALTH (SDOH): HOW HARD IS IT FOR YOU TO PAY FOR THE VERY BASICS LIKE FOOD, HOUSING, MEDICAL CARE, AND HEATING?: NOT HARD AT ALL

## 2022-07-25 NOTE — PROGRESS NOTES
Portions of this chart may have been created with voice recognition software. Occasional wrong-word or \"sound-alike\" substitutions may have occurred due to the inherent limitations of voice recognition software. Read the chart carefully and recognize, using context, where these substitutions have occurred      Chief Complaint     New Patient (Est. Discussing IUD removal)               Sangeetha Botello is a 37 y.o. female here for routine adult health maintenance. Health Maintenance   Topic Date Due    Varicella vaccine (1 of 2 - 2-dose childhood series) Never done    Pneumococcal 0-64 years Vaccine (1 - PCV) Never done    Depression Screen  Never done    Hepatitis C screen  Never done    DTaP/Tdap/Td vaccine (2 - Tdap) 09/28/2000    Cervical cancer screen  Never done    Lipids  Never done    COVID-19 Vaccine (3 - Booster for Pfizer series) 02/08/2022    Flu vaccine (1) 09/01/2022    HIV screen  Completed    Hepatitis A vaccine  Aged Out    Hepatitis B vaccine  Aged Out    Hib vaccine  Aged Out    Meningococcal (ACWY) vaccine  Aged Out              1. Routine adult health maintenance  Age-appropriate preventive health maintenance recommendations discussed with the patient. Patient states that she had had Pap smears through [facility] however on care everywhere unable to find the information.    - CBC with Auto Differential; Future  - Comprehensive Metabolic Panel; Future  - Hemoglobin A1C; Future  - Lipid Panel; Future  - TSH; Future    2. Weight gain  Patient believes she has gained weight after the ParaGard insertion. Although unlikely that the ParaGard has caused her weight gain, discussed with patient that we can do blood work to evaluate for other pathological causes of weight gain as well as to look at lifestyle habits which could have contributed to weight gain at this time.   Patient has decided for gastric sleeve surgery and has an appointment upcoming with the bariatric surgeon at [facility] management center. 3. Obesity, Class II, BMI 35-39.9, no comorbidity  Patient will discuss diet and exercise recommendations for lifestyle modification and getting ready for bariatric sleeve surgery with her weight management center. REVIEW OF SYSTEMS:  CONSTITUTIONAL: No weight loss, fever, weakness positive for fatigue. HEENT:No sore throat, runny nose, earache. No vision or hearing disturbances   CARDIOVASCULAR: No chest pain,No palpitations or edema. RESPIRATORY : No shortness of breath, cough. GASTROINTESTINAL: No nausea, vomiting, diarrhea or constipation. No abdominal pain. GENITOURINARY:No dysuria, urgency, frequency, hematuria. NEUROLOGICAL: No headache, dizziness or syncope. MUSCULOSKELETAL: No muscle, back pain, joint pain or stiffness. PSYCHIATRIC : No mood changes, currently an upset mood because of weight gain. SKIN: No rash or itching.       Lab Results   Component Value Date    WBC 2.8 (L) 12/14/2020    HGB 12.0 12/14/2020    HCT 36.1 12/14/2020    MCV 94.0 12/14/2020     (L) 12/14/2020      No results found for: LABA1C  No results found for: EAG  No results found for: TSHFT4, TSH  No results found for: CHOL  No results found for: TRIG  No results found for: HDL  No results found for: LDLCHOLESTEROL, LDLCALC  No results found for: LABVLDL, VLDL  No results found for: [facility]   Lab Results   Component Value Date     12/14/2020    K 3.8 12/14/2020     12/14/2020    CO2 18 (L) 12/14/2020    BUN 8 12/14/2020    CREATININE 0.6 12/14/2020    GLUCOSE 88 12/14/2020    CALCIUM 8.3 12/14/2020    PROT 7.7 12/13/2020    LABALBU 3.8 12/13/2020    BILITOT 0.7 12/13/2020    ALKPHOS 91 12/13/2020    AST 24 12/13/2020    ALT 15 12/13/2020    LABGLOM >60 12/14/2020    GFRAA >60 12/14/2020    AGRATIO 1.0 (L) 12/13/2020    GLOB 3.9 12/13/2020           Current Outpatient Medications   Medication Sig Dispense Refill    Paragard Intrauterine Copper IUD 1 each by IntraUTERine route continuous       No current facility-administered medications for this visit. No Known Allergies      History reviewed. No pertinent past medical history. Past Surgical History:   Procedure Laterality Date    ABDOMINOPLASTY      BREAST ENHANCEMENT SURGERY      HERNIA REPAIR      abd         History reviewed. No pertinent family history. Social History     Socioeconomic History    Marital status: Single     Spouse name: None    Number of children: None    Years of education: None    Highest education level: None   Tobacco Use    Smoking status: Never    Smokeless tobacco: Never   Vaping Use    Vaping Use: Never used   Substance and Sexual Activity    Alcohol use: Yes     Comment: occasional    Drug use: Not Currently     Social Determinants of Health     Financial Resource Strain: Low Risk     Difficulty of Paying Living Expenses: Not hard at all   Food Insecurity: No Food Insecurity    Worried About [employer] [employer] in the Last Year: Never true    [employer] in the Last Year: Never true          OBJECTIVE:      Vitals:    07/25/22 1310   BP: 110/70   Pulse: (!) 103   Temp: 97.9 °F (36.6 °C)   SpO2: 96%   Weight: 246 lb 3.2 oz (111.7 kg)   Height: 5' 7\" (1.702 m)       General appearance: alert, no distress, cooperative, appears stated age   Head: Normocephalic, without obvious abnormality, atraumatic  Eyes: conjunctivae/corneas clear. Pupils equal, round, reactive to light. Extraocular Movements intact. Ears: normal Tympanic Membranes and external ear canals bilaterally  Nose: Nares normal. Septum midline. Mucosa normal. No drainage or sinus tenderness.    Throat: Lips, mucosa, and tongue normal. Teeth and gums normal  Neck: supple, symmetrical, trachea midline, no adenopathy, thyroid: not enlarged, symmetric, no tenderness/mass/nodules  Back: inspection of back is normal, no tenderness noted   Lungs: no acute distress, clear to auscultation bilaterally  Heart: regular rate and rhythm, S1, S2 normal, no murmur, click, rub or gallop   Abdomen: soft, non-tender. Bowel sounds normal. No masses, no organomegaly   Extremities: extremities normal, atraumatic, no cyanosis or edema  Pulses: pedal pulses intact  Skin: Skin color, texture, turgor normal. No rashes or lesions  Lymph nodes: Cervical nodes normal., Supraclavicular nodes normal.   Neurologic: Alert and oriented X 3. No focal neurological deficits. Normal symmetric reflexes. Normal coordination and gait. Psychiatric: Patient has a normal mood and affect, behavior is normal. Judgment and thought content normal.      ASSESSMENT AND PLAN    [facility] was seen today for new patient. Diagnoses and all orders for this visit:    Routine adult health maintenance  -     CBC with Auto Differential; Future  -     Comprehensive Metabolic Panel; Future  -     Hemoglobin A1C; Future  -     Lipid Panel; Future  -     TSH; Future    Weight gain    Obesity, Class II, BMI 35-39.9, no comorbidity         DISCHARGE MEDICATION LIST        Medication List            Accurate as of July 25, 2022  1:43 PM. If you have any questions, ask your nurse or doctor. CONTINUE taking these medications      Paragard Intrauterine Copper IUD               Return if symptoms worsen or fail to improve. Please refer to diagnosis, orders and patient instructions for further recommendations given. All patient's questions and concerns were addressed appropriately. All orders, handouts were reviewed in detail with the patient and patient voiced understanding verbally.

## 2022-08-02 ENCOUNTER — OFFICE VISIT (OUTPATIENT)
Dept: BARIATRICS/WEIGHT MGMT | Age: 42
End: 2022-08-02
Payer: COMMERCIAL

## 2022-08-02 VITALS
HEART RATE: 84 BPM | DIASTOLIC BLOOD PRESSURE: 70 MMHG | HEIGHT: 67 IN | SYSTOLIC BLOOD PRESSURE: 130 MMHG | WEIGHT: 246.4 LBS | RESPIRATION RATE: 18 BRPM | OXYGEN SATURATION: 98 % | BODY MASS INDEX: 38.67 KG/M2

## 2022-08-02 DIAGNOSIS — G89.29 CHRONIC MIDLINE LOW BACK PAIN WITHOUT SCIATICA: ICD-10-CM

## 2022-08-02 DIAGNOSIS — K21.9 CHRONIC GERD: ICD-10-CM

## 2022-08-02 DIAGNOSIS — Z01.818 PREOPERATIVE CLEARANCE: ICD-10-CM

## 2022-08-02 DIAGNOSIS — E66.01 SEVERE OBESITY (BMI 35.0-39.9) WITH COMORBIDITY (HCC): Primary | ICD-10-CM

## 2022-08-02 DIAGNOSIS — M54.50 CHRONIC MIDLINE LOW BACK PAIN WITHOUT SCIATICA: ICD-10-CM

## 2022-08-02 PROCEDURE — G8427 DOCREV CUR MEDS BY ELIG CLIN: HCPCS | Performed by: SURGERY

## 2022-08-02 PROCEDURE — 99205 OFFICE O/P NEW HI 60 MIN: CPT | Performed by: SURGERY

## 2022-08-02 PROCEDURE — G8417 CALC BMI ABV UP PARAM F/U: HCPCS | Performed by: SURGERY

## 2022-08-02 PROCEDURE — 1036F TOBACCO NON-USER: CPT | Performed by: SURGERY

## 2022-08-02 NOTE — Clinical Note
Greatly appreciate the referral.  Excellent candidate for weight loss. We will keep you posted on Ashley progress.

## 2022-08-02 NOTE — PATIENT INSTRUCTIONS
Patient received dietary handouts and education.          -Plan for Laparoscopic sleeve gastrectomy      Pre-operative work up Ordered:    - F/U in 4 weeks. - Nutrition Labs. - Protein Shake Trial.  - Psych Evaluation.   - Cardiac Clearance. - EGD (endoscopy to check your stomach). - Support Group Attendance. - Obtain letter of medical necessity (PCP Letter). - Quit Smoking,  Alcohol, Caffeine and Carbonated Drinks  - Obtain records for Weight History 2 yrs. - Start Regular Exercise and track your activities. - Start Tracking your food Intake and follow dietary guidelines. - Avoid Pregnancy for 2 yrs from date of surgery. (for female patients in childbearing age)          Patient advised that its their responsibility to follow up for studies, referrals and/or labs ordered today.

## 2022-08-02 NOTE — PROGRESS NOTES
Any Talavera is a 37 y.o. female with a date of birth of 1980. Vitals:    08/02/22 1409   Pulse: 84   Resp: 18   SpO2: 98%    BMI: Body mass index is 38.59 kg/m². Obesity Classification: Class II    Weight History: Wt Readings from Last 3 Encounters:   08/02/22 246 lb 6.4 oz (111.8 kg)   07/25/22 246 lb 3.2 oz (111.7 kg)   12/14/20 225 lb 15.5 oz (102.5 kg)       Patient's lowest adult weight was 160 lbs at age 26. Patient's highest adult weight was 246 lbs at age 37. Patient has participated in the following weight loss programs: Weight Watcher Anonymous, Cabbage Soup Diet, , low fat, calorie restriction and low carb. Patient has participated in meal replacement/liquid diets - protein shakes. Patient has participated in weight loss medications- adipex. Patient is not lactose intolerant. Patient does not have Orthodoxy/cultural food concerns. Patient does not have food allergies. Patient does not tolerate artificial sweeteners. 24 hour recall/food frequency chart: Pt works 7a-7pm s a nurses aide. *Per NP packet, pt smokes weed. Finds herself eating more than every 2-3 hours. Breakfast: nothing   Snack: nothing    Lunch:  wings with potato wedges   Snack: nothing OR bag of chips OR kids donut   Dinner: taco salad OR spaghetti with sauce   Snack: nothing OR wings   Drinks throughout the day: Drinks water, soda occasionally  Do you drink alcohol? Yes. How often/how much alcohol do you drink: 4 margaritas per week. Patient does meet the criteria for binge eating disorder. Patient does have grazing. Patient does not have night eating. Patient does not have a history of emotional eating or eating out of boredom. Pt exercises daily - walking dog for 30-45 minutes/day. Active job as nurses dog. Surgery  Patient does feel confident in her ability to make these changes. The patient's expectations of post-surgical eating habits - voices understanding.     Patient states she does understand the consequences of not complying with post-op food guidelines. Patient states she does understands the long term changes in food intake that will be necessary for all occasions after surgery for the rest of her life. Patient is deemed nutritionally appropriate to proceed. Goals  Weight: 165-170 lbs   Health Improvement: lose weight     Assessment  Nutritional Needs: RMR=(9.99 x 111.8) + (6.25 x 170.2) - (4.92 x 41 y.o.) -161 = 1821 kcal x 1.5 (moderate activity factor)= 2731 kcal - 1000 (for 2 lb weight loss/week)= 1731 kcal.    Plan  Plan/Recommendations: Start presurgical guidelines. Goals:   -Eat 4-5 times daily  -Avoid high fat and high sugar foods  -Include protein with all meals and snacks  -Avoid carbonation and caffeine  -Avoid calorie containing beverages  -Increase physical activity as tolerated    PES Statement:  Overweight/Obesity related to lack of exercise, sedentary lifestyle, unhealthy eating habits, and unsuccessful diet attempts as evidenced by BMI. Body mass index is 38.59 kg/m². Handouts: presurgical diet eating guide     Will follow up as necessary.     Chauncey Murphy, ROSS, LD

## 2022-08-03 NOTE — PROGRESS NOTES
[facility]) Physicians   [employer]  Merlin Berumen MD, [address] [facility], [address] [place] [address]    [address] [id number] . Phone: [phone]  Fax: [phone]       Chief Complaint   Patient presents with    Bariatric, Initial Visit     NP GUERA Hughes           HPI:    Hakan Billy is a very pleasant 37 y.o. obese female ,   Body mass index is 38.59 kg/m². And multiple medical problems who is presenting for weight loss surgery evaluation and consultation by Dr. Jeanie Gorman. Patient has been struggling for several years now with obesity. Patient feels the weight is an obstacle to achieve and perform things in daily living as well risk on health. Tries to diet, and exercise but can't keep the weight off. Patient tried Weight Watcher Anonymous, Cabbage Soup Diet, , low fat, calorie restriction and low carb. Patient has participated in meal replacement/liquid diets - protein shakes. Patient has participated in weight loss medications- adipex and other regimens, but with no sustainable weight loss. Patient  is very determined to lose weight and be healthy, and is interested in surgical weight loss for future weight loss. .    Otherwise patient denies any nausea, vomiting, fevers, chills, shortness of breath, chest pain, constipation or urinary symptoms. Obesity related problems [facility] is dealing with:  Patient Active Problem List   Diagnosis    Acute pulmonary embolism with acute cor pulmonale (HCC)    Severe obesity (BMI 35.0-39. 9) with comorbidity (HCC)    Preoperative clearance    Chronic GERD    Chronic midline low back pain without sciatica           Pain Assessment   Denies any abdominal pain     History reviewed. No pertinent past medical history. Past Surgical History:   Procedure Laterality Date    ABDOMINOPLASTY      BREAST ENHANCEMENT SURGERY      HERNIA REPAIR      abd     History reviewed. No pertinent family history.   Social History Tobacco Use    Smoking status: Never    Smokeless tobacco: Never   Substance Use Topics    Alcohol use: Yes     Comment: occasional         I counseled the patient on the risks of Smoking, ETOH or Drug use, and importance of completely avoiding them, otherwise patient risks surgery cancellation or post operative serious complications if they start using any. Marisela Cmoer acknowledged, agreed not to use and wants to proceed. No Known Allergies  Vitals:    08/02/22 1409   BP: 130/70   Pulse: 84   Resp: 18   SpO2: 98%   Weight: 246 lb 6.4 oz (111.8 kg)   Height: 5' 7\" (1.702 m)       Body mass index is 38.59 kg/m². Current Outpatient Medications:     Paragard Intrauterine Copper IUD, 1 each by IntraUTERine route continuous, Disp: , Rfl:       Review of Systems - History obtained from the patient  General ROS: overweight   Psychological ROS: negative  Ophthalmic ROS: negative  Neurological ROS: negative  ENT ROS: negative  Allergy and Immunology ROS: negative  Hematological and Lymphatic ROS: negative  Endocrine ROS: overweight  Breast ROS: negative  Respiratory ROS: negative  Cardiovascular ROS: negative  Gastrointestinal ROS:negative  Genito-Urinary ROS: negative  Musculoskeletal ROS: weight effects on joints  Skin ROS: negative    Physical Exam   Constitutional: Patient is oriented to person, place, and time. Vital signs are normal. Patient  appears well-developed and well-nourished. Patient  is active and cooperative. Non-toxic appearance. No distress. HENT:   Head: Normocephalic and atraumatic. Head is without laceration. Right Ear: External ear normal. No lacerations. No drainage, swelling or tenderness. Left Ear: External ear normal. No lacerations. No drainage, swelling or tenderness. Nose/Mouth/Throat: Patient is wearing mask due to Covid-19 pandemic precautions, following CDC and health authorities guidelines.    Eyes: Conjunctivae, EOM and lids are normal. Pupils are equal, round, and reactive to light. Right eye exhibits no discharge. No foreign body present in the right eye. Left eye exhibits no discharge. No foreign body present in the left eye. No scleral icterus. Neck: Trachea normal and normal range of motion. Neck supple. No JVD present. No tracheal tenderness present. Carotid bruit is not present. No rigidity. No tracheal deviation and no edema present. No thyromegaly present. Cardiovascular: Normal rate, regular rhythm, normal heart sounds, intact distal pulses and normal pulses. Pulmonary/Chest: Effort normal and breath sounds normal. No stridor. No respiratory distress. Patient  has no wheezes. Patient has no rales. Patient exhibits no tenderness and no crepitus. Abdominal: Soft. Normal appearance and bowel sounds are normal. Patient exhibits no distension, no abdominal bruit, no ascites and no mass. There is no hepatosplenomegaly. There is no tenderness. There is no rigidity, no rebound, no guarding and no CVA tenderness. No hernia. Hernia confirmed negative in the ventral area. Musculoskeletal: Normal range of motion. Patient exhibits no edema or tenderness. Lymphadenopathy:        Head (right side): No submental, no submandibular, no preauricular, no posterior auricular and no occipital adenopathy present. Head (left side): No submental, no submandibular, no preauricular, no posterior auricular and no occipital adenopathy present. Patient  has no cervical adenopathy. Right: No supraclavicular adenopathy present. Left: No supraclavicular adenopathy present. Neurological: Patient is alert and oriented to person, place, and time. Patient has normal strength. Coordination and gait normal. GCS eye subscore is 4. GCS verbal subscore is 5. GCS motor subscore is 6. Skin: Skin is warm and dry. No abrasion and no rash noted. Patient  is not diaphoretic. No cyanosis or erythema. Psychiatric: Patient has a normal mood and affect.   speech is normal and behavior is normal. Cognition and memory are normal.         Ashley was seen today for bariatric, initial visit. Diagnoses and all orders for this visit:    Severe obesity (BMI 35.0-39. 9) with comorbidity ([address]  -     CBC with Auto Differential; Future  -     Comprehensive Metabolic Panel; Future  -     Hemoglobin A1C; Future  -     Iron and TIBC; Future  -     Lipid Panel; Future  -     TSH with Reflex; Future  -     Vitamin A; Future  -     Vitamin B1, Whole Blood; Future  -     Vitamin B12 & Folate; Future  -     Vitamin D 25 Hydroxy; Future  -     Vitamin E; Future  -     Protime-INR; Future  -     Ambulatory referral to Cardiology    Preoperative clearance  -     CBC with Auto Differential; Future  -     Comprehensive Metabolic Panel; Future  -     Hemoglobin A1C; Future  -     Iron and TIBC; Future  -     Lipid Panel; Future  -     TSH with Reflex; Future  -     Vitamin A; Future  -     Vitamin B1, Whole Blood; Future  -     Vitamin B12 & Folate; Future  -     Vitamin D 25 Hydroxy; Future  -     Vitamin E; Future  -     Protime-INR; Future  -     Ambulatory referral to Cardiology    Chronic GERD  -     CBC with Auto Differential; Future  -     Comprehensive Metabolic Panel; Future  -     Hemoglobin A1C; Future  -     Iron and TIBC; Future  -     Lipid Panel; Future  -     TSH with Reflex; Future  -     Vitamin A; Future  -     Vitamin B1, Whole Blood; Future  -     Vitamin B12 & Folate; Future  -     Vitamin D 25 Hydroxy; Future  -     Vitamin E; Future  -     Protime-INR; Future  -     Ambulatory referral to Cardiology    Chronic midline low back pain without sciatica  -     CBC with Auto Differential; Future  -     Comprehensive Metabolic Panel; Future  -     Hemoglobin A1C; Future  -     Iron and TIBC; Future  -     Lipid Panel; Future  -     TSH with Reflex; Future  -     Vitamin A; Future  -     Vitamin B1, Whole Blood; Future  -     Vitamin B12 & Folate;  Future  -     Vitamin D 25 Hydroxy; Future  -     Vitamin E; Future  -     Protime-INR; Future  -     Ambulatory referral to Cardiology          A/P  Shani Jaimes is a very pleasant 37 y.o. female with Obesity,  Body mass index is 38.59 kg/m². and multiple obesity related co-morbidities. Shani Jaimes is very motivated to lose weight and being more healthy. We discussed how her weight affects her overall health including:  Patient Active Problem List   Diagnosis    Acute pulmonary embolism with acute cor pulmonale (HCC)    Severe obesity (BMI 35.0-39. 9) with comorbidity (HCC)    Preoperative clearance    Chronic GERD    Chronic midline low back pain without sciatica          The patient underwent extensive dietary counseling with the registered dietitian. I have reviewed, discussed and agree with the dietary plan. Medical weight loss and different surgical options were discussed in details with patient. Daune Curling is interested in surgical weight loss for future weight loss. Patient is interested in Laparoscopic Sleeve Gastrectomy, which I believe is an excellent option. I explained to the patient that surgery does carry a risk specially with the coexisting comorbid conditions the patient have. Surgery as well in obese patiens can carry more risk. Risks including but not limited to; Infection, bleeding, gastric leak or obstruction, persistent nausea, vomiting, or reflux, injury to surrounding structures, risks of anesthesia, stricture, delayed gastric emptying, staple line leak, incisional hernia, malnutrition , hair loss, and/ or Conversion to Open surgery may be necessary. Failure to lose or maintain weight loss, Gallstones or Kidney Stones, Deep Venous Thrombosis , pulmonary embolism and / or death. However I do believe the benefits outweighs that risk. Shani Jaimes understands the risks and wants to proceed. We will proceed with pre-operative work up labs and studies.  Will also petition patient's  insurance for approval for this procedure. I advised the patient that we can't guarantee final insurance approval.    Patient received dietary handouts and education. Patient advised that its their responsibility to follow up for studies, referrals and/or labs ordered today. Also discussed in details the importance of follow up, as well following the recommendations and completing the whole program to improve outcomes when it comes to healthier lifestyle as well weight loss. Patient also advised about risks and benefits being on a strict dietary regimen as well using supplements. Patient agrees and wants to proceed with weight loss planning     Today's encounter included any number of the following: Bariatric Pre/Post operative work up/protocols, review of labs, imaging, provider notes, outside hospital records, performing examination/evaluation, counseling patient and/or family, ordering medications/tests, placing referrals and communication with referring physicians, coordination of care; discussing dietary plan/recall with the patient as well with registered dietitian and documentation in the EHR. Of note, the above was done during same day of the actual patient encounter. Obesity as a disease is considered a high risk to patients overall health and should therefore be considered a high risk disease state. Advised the patient that not getting there weight under control (weight loss hopefully will help with resolving/improving of the comorbid conditions),  that could increase risk of complications/worsening of those conditions on the long-term. Now with Covid-19 pandemic, CDC and health authorities does classify obese patients as vulnerable and high risk as well. Which makes weight loss a priority for improvement of their wellbeing and overall health.    CDC has issued the following statement as far Obese patients being at Increased Risk of being critically ill from SARS-Cov-2  \"Severe obesity increases the risk of a serious breathing problem called acute respiratory distress syndrome (ARDS), which is a major complication of HCAQH-47 and can cause difficulties with a doctors ability to provide respiratory support for seriously ill patients. People living with severe obesity can have multiple serious chronic diseases and underlying health conditions that can increase the risk of severe illness from COVID-19. \"       Patient Instructions   Patient received dietary handouts and education.          -Plan for Laparoscopic sleeve gastrectomy      Pre-operative work up Ordered:    - F/U in 4 weeks. - Nutrition Labs. - Protein Shake Trial.  - Psych Evaluation.   - Cardiac Clearance. - EGD (endoscopy to check your stomach). - Support Group Attendance. - Obtain letter of medical necessity (PCP Letter). - Quit Smoking,  Alcohol, Caffeine and Carbonated Drinks  - Obtain records for Weight History 2 yrs. - Start Regular Exercise and track your activities. - Start Tracking your food Intake and follow dietary guidelines. - Avoid Pregnancy for 2 yrs from date of surgery. (for female patients in childbearing age)          Patient advised that its their responsibility to follow up for studies, referrals and/or labs ordered today. Please note that some or all of this report was generated using voice recognition software. Please notify me in case of any questions about the content of this document, as some errors in transcription may have occurred .

## 2022-08-24 ENCOUNTER — OFFICE VISIT (OUTPATIENT)
Dept: FAMILY MEDICINE CLINIC | Age: 42
End: 2022-08-24
Payer: COMMERCIAL

## 2022-08-24 VITALS
DIASTOLIC BLOOD PRESSURE: 80 MMHG | HEIGHT: 67 IN | WEIGHT: 247.4 LBS | OXYGEN SATURATION: 95 % | HEART RATE: 85 BPM | BODY MASS INDEX: 38.83 KG/M2 | SYSTOLIC BLOOD PRESSURE: 110 MMHG | TEMPERATURE: 97.2 F

## 2022-08-24 DIAGNOSIS — L50.0 ALLERGIC URTICARIA: Primary | ICD-10-CM

## 2022-08-24 PROCEDURE — 99213 OFFICE O/P EST LOW 20 MIN: CPT | Performed by: FAMILY MEDICINE

## 2022-08-24 RX ORDER — METHYLPREDNISOLONE 4 MG/1
TABLET ORAL
Qty: 1 KIT | Refills: 0 | Status: SHIPPED | OUTPATIENT
Start: 2022-08-24 | End: 2022-08-30

## 2022-08-24 ASSESSMENT — PATIENT HEALTH QUESTIONNAIRE - PHQ9
SUM OF ALL RESPONSES TO PHQ QUESTIONS 1-9: 0
SUM OF ALL RESPONSES TO PHQ QUESTIONS 1-9: 0
1. LITTLE INTEREST OR PLEASURE IN DOING THINGS: 0
2. FEELING DOWN, DEPRESSED OR HOPELESS: 0
SUM OF ALL RESPONSES TO PHQ QUESTIONS 1-9: 0
SUM OF ALL RESPONSES TO PHQ9 QUESTIONS 1 & 2: 0
SUM OF ALL RESPONSES TO PHQ QUESTIONS 1-9: 0

## 2022-08-24 NOTE — PROGRESS NOTES
Portions of this chart may have been created with voice recognition software. Occasional wrong-word or \"sound-alike\" substitutions may have occurred due to the inherent limitations of voice recognition software. Read the chart carefully and recognize, using context, where these substitutions have occurred        Chief Complaint     Pruritis (Pt stated that she started experiencing itching yesterday while at work; pt states itching is everywhere., neck, back, legs, arm, etc.; pt stated she was laying on blanket at work  )               Tyron Day is a 37 y.o. female here for complaints of itching all over her body. 1. Allergic urticaria  Started last Sunday when she was laying on a couch. There is no bug bite marks. No known bug infestations or mite infestations. She states that she did not see a rash but the itching started in her back and spread all over her body and even her scalp and her ear. No acute edema, swelling, difficulty with breathing or swallowing or any other symptoms at this time. No new medication intake, supplement use. No new change in diet or other emergent medical records. No new change in detergents or any soap or cosmetics. She has not tried taking anything for the symptoms except for topical Benadryl cream with no relief in symptoms.               REVIEW OF SYSTEMS:  Symptoms noted in HPI    Lab Results   Component Value Date    WBC 2.8 (L) 12/14/2020    HGB 12.0 12/14/2020    HCT 36.1 12/14/2020    MCV 94.0 12/14/2020     (L) 12/14/2020      No results found for: LABA1C  No results found for: EAG  No results found for: TSHFT4, TSH  No results found for: CHOL  No results found for: TRIG  No results found for: HDL  No results found for: LDLCHOLESTEROL, LDLCALC  No results found for: LABVLDL, VLDL  No results found for: [facility]   Lab Results   Component Value Date     12/14/2020    K 3.8 12/14/2020     12/14/2020    CO2 18 (L) 12/14/2020 BUN 8 12/14/2020    CREATININE 0.6 12/14/2020    GLUCOSE 88 12/14/2020    CALCIUM 8.3 12/14/2020    PROT 7.7 12/13/2020    LABALBU 3.8 12/13/2020    BILITOT 0.7 12/13/2020    ALKPHOS 91 12/13/2020    AST 24 12/13/2020    ALT 15 12/13/2020    LABGLOM >60 12/14/2020    GFRAA >60 12/14/2020    AGRATIO 1.0 (L) 12/13/2020    GLOB 3.9 12/13/2020           Current Outpatient Medications   Medication Sig Dispense Refill    methylPREDNISolone (MEDROL DOSEPACK) 4 MG tablet Take by mouth. 1 kit 0     No current facility-administered medications for this visit. Allergies   Allergen Reactions    Cat Hair Extract     Dust Mite Extract     Seasonal          History reviewed. No pertinent past medical history. Past Surgical History:   Procedure Laterality Date    ABDOMINOPLASTY      BREAST ENHANCEMENT SURGERY      HERNIA REPAIR      abd         History reviewed. No pertinent family history. Social History     Socioeconomic History    Marital status: Single     Spouse name: None    Number of children: None    Years of education: None    Highest education level: None   Tobacco Use    Smoking status: Never    Smokeless tobacco: Never   Vaping Use    Vaping Use: Never used   Substance and Sexual Activity    Alcohol use: Yes     Comment: occasional    Drug use: Not Currently     Types: Marijuana Juanetta Emery)     Social Determinants of Health     Financial Resource Strain: Low Risk     Difficulty of Paying Living Expenses: Not hard at all   Food Insecurity: No Food Insecurity    Worried About [facility] in the Last Year: Never true    0 [facility] in the Last Year: Never true          OBJECTIVE:      Vitals:    08/24/22 1005   BP: 110/80   Pulse: 85   Temp: 97.2 °F (36.2 °C)   SpO2: 95%   Weight: 247 lb 6.4 oz (112.2 kg)   Height: 5' 7\" (1.702 m)         Constitutional: Patient appears well-nourished, not in any distress. HENT:WNL  Neurological:Patient is alert, oriented to person, place, and time.   No obvious focal neurological deficits  Skin: Skin is warm and moist.  Small erythematous hives seen in mid back, upper extremities bilaterally some in abdomen   Psychiatric:Patient has a normal mood and affect, behavior is normal. Judgment and thought content normal.    ASSESSMENT AND PLAN    Ashley was seen today for pruritis. Diagnoses and all orders for this visit:    Allergic urticaria    Other orders  -     methylPREDNISolone (MEDROL DOSEPACK) 4 MG tablet; Take by mouth. DISCHARGE MEDICATION LIST        Medication List            Accurate as of August 24, 2022 10:14 AM. If you have any questions, ask your nurse or doctor. START taking these medications      methylPREDNISolone 4 MG tablet  Commonly known as: MEDROL DOSEPACK  Take by mouth. Started by: Sheryle Crane, MD               Where to Get Your Medications        These medications were sent to [facility] [id number] [facility], [facility] [address]      Phone: [phone]   methylPREDNISolone 4 MG tablet          Return if symptoms worsen or fail to improve. Please refer to diagnosis, orders and patient instructions for further recommendations given. All patient's questions and concerns were addressed appropriately. All orders, handouts were reviewed in detail with the patient and patient voiced understanding verbally.

## 2022-09-01 PROBLEM — Z01.818 PREOPERATIVE CLEARANCE: Status: RESOLVED | Noted: 2022-08-02 | Resolved: 2022-09-01

## 2022-09-09 ENCOUNTER — HOSPITAL ENCOUNTER (OUTPATIENT)
Age: 42
Discharge: HOME | End: 2022-09-09
Payer: COMMERCIAL

## 2022-09-09 DIAGNOSIS — K21.9 CHRONIC GERD: ICD-10-CM

## 2022-09-09 DIAGNOSIS — Z01.818 PREOPERATIVE CLEARANCE: ICD-10-CM

## 2022-09-09 DIAGNOSIS — E66.01 SEVERE OBESITY (BMI 35.0-39.9) WITH COMORBIDITY (HCC): ICD-10-CM

## 2022-09-09 DIAGNOSIS — G89.29 CHRONIC MIDLINE LOW BACK PAIN WITHOUT SCIATICA: ICD-10-CM

## 2022-09-09 DIAGNOSIS — M54.50 CHRONIC MIDLINE LOW BACK PAIN WITHOUT SCIATICA: ICD-10-CM

## 2022-09-09 LAB
A/G RATIO: 0.9 (ref 1.1–2.2)
ALBUMIN SERPL-MCNC: 3.4 G/DL (ref 3.4–5)
ALP BLD-CCNC: 97 U/L (ref 40–129)
ALT SERPL-CCNC: 14 U/L (ref 10–40)
ANION GAP SERPL CALCULATED.3IONS-SCNC: 11 MMOL/L (ref 3–16)
AST SERPL-CCNC: 20 U/L (ref 15–37)
BASOPHILS ABSOLUTE: 0 K/UL (ref 0–0.2)
BASOPHILS RELATIVE PERCENT: 0.3 %
BILIRUB SERPL-MCNC: 0.4 MG/DL (ref 0–1)
BUN BLDV-MCNC: 7 MG/DL (ref 7–20)
CALCIUM SERPL-MCNC: 8.8 MG/DL (ref 8.3–10.6)
CHLORIDE BLD-SCNC: 104 MMOL/L (ref 99–110)
CHOLESTEROL, TOTAL: 165 MG/DL (ref 0–199)
CO2: 22 MMOL/L (ref 21–32)
CREAT SERPL-MCNC: 0.7 MG/DL (ref 0.6–1.1)
EOSINOPHILS ABSOLUTE: 0.2 K/UL (ref 0–0.6)
EOSINOPHILS RELATIVE PERCENT: 3.7 %
FOLATE: 4.37 NG/ML (ref 4.78–24.2)
GFR AFRICAN AMERICAN: >60
GFR NON-AFRICAN AMERICAN: >60
GLUCOSE BLD-MCNC: 81 MG/DL (ref 70–99)
HCT VFR BLD CALC: 36.8 % (ref 36–48)
HDLC SERPL-MCNC: 62 MG/DL (ref 40–60)
HEMOGLOBIN: 12.1 G/DL (ref 12–16)
INR BLD: 1.06 (ref 0.87–1.14)
IRON SATURATION: 24 % (ref 15–50)
IRON: 68 UG/DL (ref 37–145)
LDL CHOLESTEROL CALCULATED: 94 MG/DL
LYMPHOCYTES ABSOLUTE: 1.4 K/UL (ref 1–5.1)
LYMPHOCYTES RELATIVE PERCENT: 32.2 %
MCH RBC QN AUTO: 30.5 PG (ref 26–34)
MCHC RBC AUTO-ENTMCNC: 33 G/DL (ref 31–36)
MCV RBC AUTO: 92.4 FL (ref 80–100)
MONOCYTES ABSOLUTE: 0.4 K/UL (ref 0–1.3)
MONOCYTES RELATIVE PERCENT: 8 %
NEUTROPHILS ABSOLUTE: 2.5 K/UL (ref 1.7–7.7)
NEUTROPHILS RELATIVE PERCENT: 55.8 %
PDW BLD-RTO: 15.2 % (ref 12.4–15.4)
PLATELET # BLD: 205 K/UL (ref 135–450)
PMV BLD AUTO: 7.8 FL (ref 5–10.5)
POTASSIUM SERPL-SCNC: 4 MMOL/L (ref 3.5–5.1)
PROTHROMBIN TIME: 13.7 SEC (ref 11.7–14.5)
RBC # BLD: 3.98 M/UL (ref 4–5.2)
SODIUM BLD-SCNC: 137 MMOL/L (ref 136–145)
TOTAL IRON BINDING CAPACITY: 282 UG/DL (ref 260–445)
TOTAL PROTEIN: 7.3 G/DL (ref 6.4–8.2)
TRIGL SERPL-MCNC: 47 MG/DL (ref 0–150)
TSH REFLEX: 2.19 UIU/ML (ref 0.27–4.2)
VITAMIN B-12: 408 PG/ML (ref 211–911)
VITAMIN D 25-HYDROXY: 12.3 NG/ML
VLDLC SERPL CALC-MCNC: 9 MG/DL
WBC # BLD: 4.5 K/UL (ref 4–11)

## 2022-09-09 PROCEDURE — 85610 PROTHROMBIN TIME: CPT

## 2022-09-09 PROCEDURE — 83036 HEMOGLOBIN GLYCOSYLATED A1C: CPT

## 2022-09-09 PROCEDURE — 84425 ASSAY OF VITAMIN B-1: CPT

## 2022-09-09 PROCEDURE — 80061 LIPID PANEL: CPT

## 2022-09-09 PROCEDURE — 84443 ASSAY THYROID STIM HORMONE: CPT

## 2022-09-09 PROCEDURE — 83550 IRON BINDING TEST: CPT

## 2022-09-09 PROCEDURE — 83540 ASSAY OF IRON: CPT

## 2022-09-09 PROCEDURE — 84590 ASSAY OF VITAMIN A: CPT

## 2022-09-09 PROCEDURE — 84446 ASSAY OF VITAMIN E: CPT

## 2022-09-09 PROCEDURE — 82746 ASSAY OF FOLIC ACID SERUM: CPT

## 2022-09-09 PROCEDURE — 36415 COLL VENOUS BLD VENIPUNCTURE: CPT

## 2022-09-09 PROCEDURE — 82607 VITAMIN B-12: CPT

## 2022-09-09 PROCEDURE — 80053 COMPREHEN METABOLIC PANEL: CPT

## 2022-09-09 PROCEDURE — 82306 VITAMIN D 25 HYDROXY: CPT

## 2022-09-09 PROCEDURE — 85025 COMPLETE CBC W/AUTO DIFF WBC: CPT

## 2022-09-10 LAB
ESTIMATED AVERAGE GLUCOSE: 99.7 MG/DL
HBA1C MFR BLD: 5.1 %

## 2022-09-13 ENCOUNTER — OFFICE VISIT (OUTPATIENT)
Dept: BARIATRICS/WEIGHT MGMT | Age: 42
End: 2022-09-13
Payer: COMMERCIAL

## 2022-09-13 VITALS
HEIGHT: 67 IN | OXYGEN SATURATION: 98 % | HEART RATE: 83 BPM | WEIGHT: 251.2 LBS | SYSTOLIC BLOOD PRESSURE: 132 MMHG | RESPIRATION RATE: 18 BRPM | BODY MASS INDEX: 39.43 KG/M2 | DIASTOLIC BLOOD PRESSURE: 81 MMHG

## 2022-09-13 DIAGNOSIS — E66.01 SEVERE OBESITY (BMI 35.0-39.9) WITH COMORBIDITY (HCC): Primary | ICD-10-CM

## 2022-09-13 DIAGNOSIS — M54.50 CHRONIC MIDLINE LOW BACK PAIN WITHOUT SCIATICA: ICD-10-CM

## 2022-09-13 DIAGNOSIS — K21.9 CHRONIC GERD: ICD-10-CM

## 2022-09-13 DIAGNOSIS — G89.29 CHRONIC MIDLINE LOW BACK PAIN WITHOUT SCIATICA: ICD-10-CM

## 2022-09-13 LAB
ALPHA-TOCOPHEROL: 6.6 MG/L (ref 5.5–18)
GAMMA-TOCOPHEROL: 1.4 MG/L (ref 0–6)
RETINYL PALMITATE: 0.04 MG/L (ref 0–0.1)
VITAMIN A LEVEL: 0.25 MG/L (ref 0.3–1.2)
VITAMIN A, INTERP: ABNORMAL

## 2022-09-13 PROCEDURE — G8427 DOCREV CUR MEDS BY ELIG CLIN: HCPCS | Performed by: SURGERY

## 2022-09-13 PROCEDURE — G8417 CALC BMI ABV UP PARAM F/U: HCPCS | Performed by: SURGERY

## 2022-09-13 PROCEDURE — 1036F TOBACCO NON-USER: CPT | Performed by: SURGERY

## 2022-09-13 PROCEDURE — 99214 OFFICE O/P EST MOD 30 MIN: CPT | Performed by: SURGERY

## 2022-09-13 NOTE — PROGRESS NOTES
standpoint:   The patient has normal exercise tolerance, able to do > 4 METS easily, has a normal ECG, and has no red-flags on family history to be of concern and thus is LOW-RISK for bariatric surgery. No further cardiac evaluation is needed before surgery. 2. History of Acute PE with acute cor [id number]/20 CT Chest Pulmonary Embolism  Pulmonary emboli affecting all lobes. No saddle embolus. No radiologic   findings to suggest right ventricular strain.   -Factor V Leiden mutation-negative  -Prothrombin gene mutation-pending  -Cardiolipin IgG, IgM, IgA-negative. The patient has progestin IUD placed couple of weeks prior to hospitalization.  -Patient's sister was diagnosed with pulmonary embolism in 2019 after she had IUD placed. - Eliquis 5 mg BID for 6 months  Plan: Follow up with PCP        Patient counseled on lifestyle modification, diet, and exercise. Follow Up: As needed    Juan Manuel Swift MD  Cardiologist Steffi Banegas    Scribe's Attestation: This note was scribed in the presence of Dr. Yesenia Bustamante MD by Tamara Beauchamp RN. 09/19/22     Physician Attestation  The scribe wrote this note in the presence of me Juan Manuel Swift MD). The scribe may have prepopulated components of this note with my historical  intellectual property under my direct supervision. Any additions to this intellectual property were performed in my presence and at my direction. Furthermore, the content and accuracy of this note have been reviewed by me with edits by me as needed.   Juan Manuel Swift MD 9/19/2022 3:16 PM

## 2022-09-13 NOTE — PROGRESS NOTES
[facility]) Physicians   [employer]  Susu Simon MD, [facility], [address], [address] [facility]    [place]  [address] . Phone: [phone]  Fax: [phone]          Chief Complaint   Patient presents with    Obesity     2nd pre-surg         HPI:     Yanet Jimenez is a very pleasant 37 y.o. female with Body mass index is 39.34 kg/m². / Chronic Obesity. Lorna Chase has been struggling for several years now with obesity. Ashley feels the weight is an obstacle to achieve and perform things in daily living as well risk on health. Patient  is very determined to lose weight and be healthy, and is working towards  surgical weight loss to achieve this goal. Pre-operative clearance and work up pending. Working hard to keep good dietary habits as well level of activity. Patient denies any nausea, vomiting, fevers, chills, shortness of breath, chest pain, cough, constipation or difficulty urinating. Pain Assessment   Denies any abdominal pain       History reviewed. No pertinent past medical history. Past Surgical History:   Procedure Laterality Date    ABDOMINOPLASTY      BREAST ENHANCEMENT SURGERY      HERNIA REPAIR      abd     History reviewed. No pertinent family history. Social History     Tobacco Use    Smoking status: Never    Smokeless tobacco: Never   Substance Use Topics    Alcohol use: Yes     Comment: occasional     I counseled the patient on the importance of not smoking and risks of ETOH. Allergies   Allergen Reactions    Cat Hair Extract     Dust Mite Extract     Seasonal      Vitals:    09/13/22 0823   BP: 132/81   Pulse: 83   Resp: 18   SpO2: 98%   Weight: 251 lb 3.2 oz (113.9 kg)   Height: 5' 7\" (1.702 m)       Body mass index is 39.34 kg/m².     Lab Results   Component Value Date/Time    WBC 4.5 09/09/2022 10:29 AM    RBC 3.98 09/09/2022 10:29 AM    HGB 12.1 09/09/2022 10:29 AM    HCT 36.8 09/09/2022 10:29 AM    MCV 92.4 09/09/2022 10:29 AM    MCH 30.5 09/09/2022 10:29 AM    MCHC 33.0 09/09/2022 10:29 AM    MPV 7.8 09/09/2022 10:29 AM    NEUTOPHILPCT 55.8 09/09/2022 10:29 AM    LYMPHOPCT 32.2 09/09/2022 10:29 AM    MONOPCT 8.0 09/09/2022 10:29 AM    EOSRELPCT 3.7 09/09/2022 10:29 AM    BASOPCT 0.3 09/09/2022 10:29 AM    NEUTROABS 2.5 09/09/2022 10:29 AM    LYMPHSABS 1.4 09/09/2022 10:29 AM    MONOSABS 0.4 09/09/2022 10:29 AM    EOSABS 0.2 09/09/2022 10:29 AM     Lab Results   Component Value Date/Time     09/09/2022 10:29 AM    K 4.0 09/09/2022 10:29 AM    K 3.8 12/14/2020 06:00 AM     09/09/2022 10:29 AM    CO2 22 09/09/2022 10:29 AM    ANIONGAP 11 09/09/2022 10:29 AM    GLUCOSE 81 09/09/2022 10:29 AM    BUN 7 09/09/2022 10:29 AM    CREATININE 0.7 09/09/2022 10:29 AM    LABGLOM >60 09/09/2022 10:29 AM    GFRAA >60 09/09/2022 10:29 AM    CALCIUM 8.8 09/09/2022 10:29 AM    PROT 7.3 09/09/2022 10:29 AM    LABALBU 3.4 09/09/2022 10:29 AM    AGRATIO 0.9 09/09/2022 10:29 AM    BILITOT 0.4 09/09/2022 10:29 AM    ALKPHOS 97 09/09/2022 10:29 AM    ALT 14 09/09/2022 10:29 AM    AST 20 09/09/2022 10:29 AM    GLOB 3.9 12/13/2020 09:59 AM     Lab Results   Component Value Date/Time    CHOL 165 09/09/2022 10:29 AM    TRIG 47 09/09/2022 10:29 AM    HDL 62 09/09/2022 10:29 AM    LDLCALC 94 09/09/2022 10:29 AM    LABVLDL 9 09/09/2022 10:29 AM     Lab Results   Component Value Date/Time    TSHREFLEX 2.19 09/09/2022 10:29 AM     Lab Results   Component Value Date/Time    IRON 68 09/09/2022 10:29 AM    TIBC 282 09/09/2022 10:29 AM    LABIRON 24 09/09/2022 10:29 AM     Lab Results   Component Value Date/Time    DVTRVOOP68 408 09/09/2022 10:29 AM    FOLATE 4.37 09/09/2022 10:29 AM     Lab Results   Component Value Date/Time    VITD25 12.3 09/09/2022 10:29 AM     Lab Results   Component Value Date/Time    LABA1C 5.1 09/09/2022 10:29 AM    EAG 99.7 09/09/2022 10:29 AM         Current Outpatient Medications:     Cholecalciferol 50 MCG (2000 UT) TABS, Take 1 tablet by mouth daily Take 1 tablet by mouth daily. Start this medication after you finish the weekly regimen, Disp: 90 tablet, Rfl: 2    vitamin D (CHOLECALCIFEROL) 29994 UNIT CAPS, Take 1 capsule by mouth once a week, Disp: 12 capsule, Rfl: 0    Review of Systems - History obtained from the patient  General ROS: negative  Psychological ROS: negative  Ophthalmic ROS: negative  Neurological ROS: negative  ENT ROS: negative  Allergy and Immunology ROS: negative  Hematological and Lymphatic ROS: negative  Endocrine ROS: negative  Breast ROS: negative  Respiratory ROS: negative  Cardiovascular ROS: negative  Gastrointestinal ROS:negative  Genito-Urinary ROS: negative  Musculoskeletal ROS: negative   Skin ROS: negative    Physical Exam   Vitals Reviewed   Constitutional: Patient is oriented to person, place, and time. Patient appears well-developed and well-nourished. Patient is active and cooperative. Non-toxic appearance. No distress. HENT:   Head: Normocephalic and atraumatic. Head is without abrasion and without laceration. Hair is normal.   Right Ear: External ear normal. No lacerations. No drainage, swelling . Left Ear: External ear normal. No lacerations. No drainage, swelling. Nose/Mouth: face mask in place  Eyes: Conjunctivae, EOM and lids are normal. Right eye exhibits no discharge. No foreign body present in the right eye. Left eye exhibits no discharge. No foreign body present in the left eye. No scleral icterus. Neck: Trachea normal and normal range of motion. No JVD present. Pulmonary/Chest: Effort normal. No accessory muscle usage or stridor. No apnea. No respiratory distress. Cardiovascular: Normal rate and no JVD. Abdominal: Normal appearance. Patient exhibits no distension. Abdomen is soft, obese, non tender. Musculoskeletal: Normal range of motion. Patient exhibits no edema. Neurological: Patient is alert and oriented to person, place, and time. Patient has normal strength. GCS eye subscore is 4.  GCS verbal Preoperative work up/protocols, review of labs, imaging, provider notes, outside hospital records, performing examination/evaluation, counseling patient and/or family, ordering medications/tests, placing referrals and communication with referring physicians, coordination of care; discussing dietary plan/recall with the patient as well with registered dietitian and documentation in the EHR. Of note, the above was done during same day of the actual patient encounter. Christine Cedillo is here for her second presurgical visit. Patient has not yet started making any changes. Stressed about the importance of following our presurgical recommendations and trying to adopt gradually however needs to start doing that as soon as possible. Also patient gained 5 pounds that she needs to lose due to low weight gain policy with [facility]. Discussed with the patient her labs that were within normal limits except for vitamin D being low and vitamin D supplements were sent. Slight low levels of folic acid however patient is taking a multivitamin. We will monitor for now. Patient is seeing her oncologist as well to adjust her IUD as she feels that this is contributing to her weight gain. See the patient next month for continued follow-up. We discussed how her excess weight affects her overall health and importance of weight loss, healthy diet and active lifestyle to alleviate those co morbid conditions, otherwise risk deterioration. Severe obesity: Body mass index is 39.34 kg/m². [x] Continue to make dietary and lifestyle modifications. [x] Plan for Future laparoscopic sleeve gastrectomy. [x] Return for follow-up next month. Chronic GERD:   [x] Continue to make dietary and lifestyle modifications. [] Continue Omeprazole. [] Continue Famotidine. [x] Plan for EGD to evaluate the stomach. Chronic midline low back pain without sciatica:  [x] Continue to make dietary and lifestyle modifications.   [x] Continue with weight loss. Patient advised that its their responsibility to follow up for studies, referrals and/or labs ordered today.

## 2022-09-13 NOTE — PROGRESS NOTES
[id number] [facility] gained 4.8 lbs over past 6 weeks. Pt works 7a-7pm as a nurses aide. Pt has been following the cabbage soup diet and sticking to lower carb, interested in doing an egg diet. Just got back from multiple trips out of town - [place], [place], etc. States she didn't do good with food choices when out of town. Breakfast: premier protein shake     Snack: nothing     Lunch: 1/2 Salad with veggies with ranch/Armenian dressing/cheese OR 1/2 veggie bowl from chipotle     Snack: nothing     Dinner: 1/2 Salad with veggies with ranch/Armenian dressing/cheese OR 1/2 veggie bowl from [employer] OR cabbage soup with smoked turkey     Snack: nothing     Is pt consuming smaller portions? Feels it is well controlled     Is pt consuming at least 64 oz of fluids per day? Yes     Is pt consuming carbonated, caffeinated, or sugary beverages? Yes - Drinks water, alcohol     Has pt sampled Unjury and/or Nectar protein? Not yet, discussed today     Has patient attended a support group? Not yet, discussed today    Exercise: Pt exercises daily - walking dog for 45 minutes -1 hour per day. Active job as nurses aide.      Plan/Recommendations:   Avoid cabbage soup/egg diet and focus on presurgical guidelines (pt still has packet she can refer to)  Eat 4 x day   Include protein with all meals/snacks   Eliminate alcohol   Try protein powder   Attend SG     Handouts: none     Cleo Guzman, RD, LD

## 2022-09-16 LAB — VITAMIN B1 WHOLE BLOOD: 89 NMOL/L (ref 70–180)

## 2022-09-19 ENCOUNTER — OFFICE VISIT (OUTPATIENT)
Dept: CARDIOLOGY CLINIC | Age: 42
End: 2022-09-19
Payer: COMMERCIAL

## 2022-09-19 VITALS
BODY MASS INDEX: 38.94 KG/M2 | SYSTOLIC BLOOD PRESSURE: 102 MMHG | HEART RATE: 105 BPM | HEIGHT: 67 IN | WEIGHT: 248.1 LBS | DIASTOLIC BLOOD PRESSURE: 64 MMHG | OXYGEN SATURATION: 94 %

## 2022-09-19 DIAGNOSIS — Z01.818 PRE-OPERATIVE CLEARANCE: Primary | ICD-10-CM

## 2022-09-19 DIAGNOSIS — E66.01 SEVERE OBESITY (BMI 35.0-39.9) WITH COMORBIDITY (HCC): ICD-10-CM

## 2022-09-19 DIAGNOSIS — I26.09 ACUTE PULMONARY EMBOLISM WITH ACUTE COR PULMONALE, UNSPECIFIED PULMONARY EMBOLISM TYPE (HCC): ICD-10-CM

## 2022-09-19 PROCEDURE — 93000 ELECTROCARDIOGRAM COMPLETE: CPT | Performed by: INTERNAL MEDICINE

## 2022-09-19 PROCEDURE — 1036F TOBACCO NON-USER: CPT | Performed by: INTERNAL MEDICINE

## 2022-09-19 PROCEDURE — G8427 DOCREV CUR MEDS BY ELIG CLIN: HCPCS | Performed by: INTERNAL MEDICINE

## 2022-09-19 PROCEDURE — 99203 OFFICE O/P NEW LOW 30 MIN: CPT | Performed by: INTERNAL MEDICINE

## 2022-09-19 PROCEDURE — G8417 CALC BMI ABV UP PARAM F/U: HCPCS | Performed by: INTERNAL MEDICINE

## 2022-10-18 ENCOUNTER — OFFICE VISIT (OUTPATIENT)
Dept: BARIATRICS/WEIGHT MGMT | Age: 42
End: 2022-10-18
Payer: COMMERCIAL

## 2022-10-18 VITALS
DIASTOLIC BLOOD PRESSURE: 72 MMHG | WEIGHT: 248 LBS | HEART RATE: 99 BPM | BODY MASS INDEX: 38.92 KG/M2 | SYSTOLIC BLOOD PRESSURE: 124 MMHG | RESPIRATION RATE: 18 BRPM | HEIGHT: 67 IN | OXYGEN SATURATION: 100 %

## 2022-10-18 DIAGNOSIS — E66.01 SEVERE OBESITY (BMI 35.0-39.9) WITH COMORBIDITY (HCC): Primary | ICD-10-CM

## 2022-10-18 DIAGNOSIS — K21.9 CHRONIC GERD: ICD-10-CM

## 2022-10-18 DIAGNOSIS — G89.29 CHRONIC MIDLINE LOW BACK PAIN WITHOUT SCIATICA: ICD-10-CM

## 2022-10-18 DIAGNOSIS — M54.50 CHRONIC MIDLINE LOW BACK PAIN WITHOUT SCIATICA: ICD-10-CM

## 2022-10-18 PROCEDURE — G8427 DOCREV CUR MEDS BY ELIG CLIN: HCPCS | Performed by: SURGERY

## 2022-10-18 PROCEDURE — 1036F TOBACCO NON-USER: CPT | Performed by: SURGERY

## 2022-10-18 PROCEDURE — G8484 FLU IMMUNIZE NO ADMIN: HCPCS | Performed by: SURGERY

## 2022-10-18 PROCEDURE — 99214 OFFICE O/P EST MOD 30 MIN: CPT | Performed by: SURGERY

## 2022-10-18 PROCEDURE — G8417 CALC BMI ABV UP PARAM F/U: HCPCS | Performed by: SURGERY

## 2022-10-18 NOTE — PROGRESS NOTES
[facility]) Physicians   [employer]  Sonja Luna MD, [facility], [address], [address] [facility]    [place] [address] . Phone: [phone]  Fax: [phone]          Chief Complaint   Patient presents with    Obesity     3rd pre-surg         HPI:     Helio Corea is a very pleasant 40 y.o. female with Body mass index is 38.84 kg/m². / Chronic Obesity. Michael Hodgson has been struggling for several years now with obesity. Ashley feels the weight is an obstacle to achieve and perform things in daily living as well risk on health. Patient  is very determined to lose weight and be healthy, and is working towards  surgical weight loss to achieve this goal. Pre-operative clearance and work up pending. Working hard to keep good dietary habits as well level of activity. Patient denies any nausea, vomiting, fevers, chills, shortness of breath, chest pain, cough, constipation or difficulty urinating. Pain Assessment   Denies any abdominal pain       History reviewed. No pertinent past medical history. Past Surgical History:   Procedure Laterality Date    ABDOMINOPLASTY      BREAST ENHANCEMENT SURGERY      HERNIA REPAIR      abd     History reviewed. No pertinent family history. Social History     Tobacco Use    Smoking status: Former     Types: Cigarettes    Smokeless tobacco: Never   Substance Use Topics    Alcohol use: Yes     Comment: occasional     I counseled the patient on the importance of not smoking and risks of ETOH. Allergies   Allergen Reactions    Cat Hair Extract     Dust Mite Extract     Seasonal      Vitals:    10/18/22 1000   BP: 124/72   Pulse: 99   Resp: 18   SpO2: 100%   Weight: 248 lb (112.5 kg)   Height: 5' 7\" (1.702 m)       Body mass index is 38.84 kg/m².     Lab Results   Component Value Date/Time    WBC 4.5 09/09/2022 10:29 AM    RBC 3.98 09/09/2022 10:29 AM    HGB 12.1 09/09/2022 10:29 AM    HCT 36.8 09/09/2022 10:29 AM    MCV 92.4 09/09/2022 10:29 AM    MCH 30.5 09/09/2022 10:29 AM    MCHC 33.0 09/09/2022 10:29 AM    MPV 7.8 09/09/2022 10:29 AM    NEUTOPHILPCT 55.8 09/09/2022 10:29 AM    LYMPHOPCT 32.2 09/09/2022 10:29 AM    MONOPCT 8.0 09/09/2022 10:29 AM    EOSRELPCT 3.7 09/09/2022 10:29 AM    BASOPCT 0.3 09/09/2022 10:29 AM    NEUTROABS 2.5 09/09/2022 10:29 AM    LYMPHSABS 1.4 09/09/2022 10:29 AM    MONOSABS 0.4 09/09/2022 10:29 AM    EOSABS 0.2 09/09/2022 10:29 AM     Lab Results   Component Value Date/Time     09/09/2022 10:29 AM    K 4.0 09/09/2022 10:29 AM    K 3.8 12/14/2020 06:00 AM     09/09/2022 10:29 AM    CO2 22 09/09/2022 10:29 AM    ANIONGAP 11 09/09/2022 10:29 AM    GLUCOSE 81 09/09/2022 10:29 AM    BUN 7 09/09/2022 10:29 AM    CREATININE 0.7 09/09/2022 10:29 AM    LABGLOM >60 09/09/2022 10:29 AM    GFRAA >60 09/09/2022 10:29 AM    CALCIUM 8.8 09/09/2022 10:29 AM    PROT 7.3 09/09/2022 10:29 AM    LABALBU 3.4 09/09/2022 10:29 AM    AGRATIO 0.9 09/09/2022 10:29 AM    BILITOT 0.4 09/09/2022 10:29 AM    ALKPHOS 97 09/09/2022 10:29 AM    ALT 14 09/09/2022 10:29 AM    AST 20 09/09/2022 10:29 AM    GLOB 3.9 12/13/2020 09:59 AM     Lab Results   Component Value Date/Time    CHOL 165 09/09/2022 10:29 AM    TRIG 47 09/09/2022 10:29 AM    HDL 62 09/09/2022 10:29 AM    LDLCALC 94 09/09/2022 10:29 AM    LABVLDL 9 09/09/2022 10:29 AM     Lab Results   Component Value Date/Time    TSHREFLEX 2.19 09/09/2022 10:29 AM     Lab Results   Component Value Date/Time    IRON 68 09/09/2022 10:29 AM    TIBC 282 09/09/2022 10:29 AM    LABIRON 24 09/09/2022 10:29 AM     Lab Results   Component Value Date/Time    TGMVBKJA21 408 09/09/2022 10:29 AM    FOLATE 4.37 09/09/2022 10:29 AM     Lab Results   Component Value Date/Time    VITD25 12.3 09/09/2022 10:29 AM     Lab Results   Component Value Date/Time    LABA1C 5.1 09/09/2022 10:29 AM    EAG 99.7 09/09/2022 10:29 AM         Current Outpatient Medications:     Cholecalciferol 50 MCG (2000 UT) TABS, Take 1 tablet by mouth daily Take 1 tablet by mouth daily. Start this medication after you finish the weekly regimen, Disp: 90 tablet, Rfl: 2    vitamin D (CHOLECALCIFEROL) 03188 UNIT CAPS, Take 1 capsule by mouth once a week, Disp: 12 capsule, Rfl: 0    Review of Systems - History obtained from the patient  General ROS: negative  Psychological ROS: negative  Ophthalmic ROS: negative  Neurological ROS: negative  ENT ROS: negative  Allergy and Immunology ROS: negative  Hematological and Lymphatic ROS: negative  Endocrine ROS: negative  Breast ROS: negative  Respiratory ROS: negative  Cardiovascular ROS: negative  Gastrointestinal ROS:negative  Genito-Urinary ROS: negative  Musculoskeletal ROS: negative   Skin ROS: negative    Physical Exam   Vitals Reviewed   Constitutional: Patient is oriented to person, place, and time. Patient appears well-developed and well-nourished. Patient is active and cooperative. Non-toxic appearance. No distress. HENT:   Head: Normocephalic and atraumatic. Head is without abrasion and without laceration. Hair is normal.   Right Ear: External ear normal. No lacerations. No drainage, swelling . Left Ear: External ear normal. No lacerations. No drainage, swelling. Nose/Mouth: face mask in place  Eyes: Conjunctivae, EOM and lids are normal. Right eye exhibits no discharge. No foreign body present in the right eye. Left eye exhibits no discharge. No foreign body present in the left eye. No scleral icterus. Neck: Trachea normal and normal range of motion. No JVD present. Pulmonary/Chest: Effort normal. No accessory muscle usage or stridor. No apnea. No respiratory distress. Cardiovascular: Normal rate and no JVD. Abdominal: Normal appearance. Patient exhibits no distension. Abdomen is soft, obese, non tender. Musculoskeletal: Normal range of motion. Patient exhibits no edema. Neurological: Patient is alert and oriented to person, place, and time. Patient has normal strength.  GCS eye subscore is 4. GCS verbal subscore is 5. GCS motor subscore is 6. Skin: Skin is warm and dry. No abrasion and no rash noted. Patient is not diaphoretic. No cyanosis or erythema. Psychiatric: Patient has a normal mood and affect. Speech is normal and behavior is normal. Cognition and memory are normal.       A/P    Bassem Shell is 40 y.o. female, Body mass index is 38.84 kg/m². pre surgery, has lost 3 lbs since last visit. The patient underwent extensive dietary counseling with registered dietician. I have reviewed, discussed and agree with the dietary plan. Patient is trying hard to keep good dietary and behavior modifications. Patient is monitoring portion sizes, food choices and liquid calories. Patient is trying to exercise regularly as much as possible. Obesity as a disease is considered a high risk to patients overall health and should therefore be considered a high risk disease state. Advised the patient that not getting there weight under control, that could increase risk of complications/worsening of those conditions on the long-term. (Goal of weight loss surgery is to alleviate/control some of those co-morbidities)    Now with Covid-19 pandemic, CDC and health authorities does classify obese patients as vulnerable and high risk as well. Which makes weight loss a priority for improvement of their wellbeing and overall health. I encouraged the patient to continue exercise and keeping healthy eating habits. Discussed pre-op labs and work up till now. Also counseled the patient extensively on Surgery. I did explain thoroughly to the patient that compliance with pre- and post op diet and other recommendations are integral part to improve the chances of successful weight loss and also not following it could end with serious health complications.    Some strategies discussed today include but not limited to : 30/30/30 minutes rule, food diary, avoid fast food and packing/planing ahead, & increasing exercise. Also stressed to the patient importance of taking the multivitamins as instructed, otherwise risk significant complications. The visit today, included any number of the following: Bariatric Preoperative work up/protocols, review of labs, imaging, provider notes, outside hospital records, performing examination/evaluation, counseling patient and/or family, ordering medications/tests, placing referrals and communication with referring physicians, coordination of care; discussing dietary plan/recall with the patient as well with registered dietitian and documentation in the EHR. Of note, the above was done during same day of the actual patient encounter. Carina Hu is here for her third presurgical visit. Patient was advised to start multivitamin. Patient still working her preoperative clearances. We will see the patient next month for continued follow-up. We discussed how her excess weight affects her overall health and importance of weight loss, healthy diet and active lifestyle to alleviate those co morbid conditions, otherwise risk deterioration. Severe Obesity: Body mass index is 38.84 kg/m². [x] Continue to make dietary and lifestyle modifications. [x] Plan for Future laparoscopic sleeve gastrectomy. [x] Return for follow-up next month. Chronic GERD:   [x] Continue to make dietary and lifestyle modifications. [] Continue Omeprazole. [] Continue Famotidine. [] Plan for EGD to evaluate the stomach. Chronic midline low back pain without sciatica:  [x] Continue to make dietary and lifestyle modifications. [x] Continue with weight loss. Patient advised that its their responsibility to follow up for studies, referrals and/or labs ordered today.

## 2022-10-18 NOTE — PROGRESS NOTES
[id number] [facility] lost 3.2 lbs over 1 month. Is pt eating at least 4 times everyday? \"I try to\"    Breakfast: 2 pc leyva and hb egg, sometimes premier protein shake    Snack: no    Lunch: house salad with English dressing and cheese    Snack:no    Dinner: cranberry meatballs (skipped the rice) and rest of her salad    Snack:no\      Is pt eating a lean protein source with all meals and snacks? Most of the time     Has pt decreased their portions using the plate method? yes    Is pt choosing low fat/sugar free options? yes she is    Has increased frequency of fruits and vegetables. Is pt drinking at least 64 oz of clear liquids everyday? yes trying to get in a gallon a day      Has pt stopped drinking carbonation, caffeinated, and sugar sweetened beverages? yes etoh occasionally glass of wine 1-2 times per month. This last weekend had a shot of vodka. Has pt sampled Unjury and/or Nectar protein? Has not tried any from here yet    Participating in intentional exercise? Physical activity: walking dog 45 min. Active job - nurse's aide    Support group?  Did not schedule yet    Plan/Recommendations:     Decrease water to around 100oz  Protein 4x per day  Avoid alcohol  Handouts: protein shake & support group schedule       Kelsey Patricia, MS, RD, LD

## 2022-10-18 NOTE — PATIENT INSTRUCTIONS
Patient received dietary handouts and education.       Plan/Recommendations:     Decrease water to around 100oz  Protein 4x per day  Avoid alcohol  Handouts: protein shake  Schedule support group

## 2022-10-19 PROBLEM — Z01.818 PRE-OPERATIVE CLEARANCE: Status: RESOLVED | Noted: 2022-08-02 | Resolved: 2022-10-19

## 2022-11-29 ENCOUNTER — OFFICE VISIT (OUTPATIENT)
Dept: BARIATRICS/WEIGHT MGMT | Age: 42
End: 2022-11-29
Payer: COMMERCIAL

## 2022-11-29 VITALS
DIASTOLIC BLOOD PRESSURE: 64 MMHG | BODY MASS INDEX: 39.08 KG/M2 | HEIGHT: 67 IN | HEART RATE: 95 BPM | RESPIRATION RATE: 18 BRPM | SYSTOLIC BLOOD PRESSURE: 122 MMHG | WEIGHT: 249 LBS | OXYGEN SATURATION: 99 %

## 2022-11-29 DIAGNOSIS — G89.29 CHRONIC MIDLINE LOW BACK PAIN WITHOUT SCIATICA: ICD-10-CM

## 2022-11-29 DIAGNOSIS — E66.01 SEVERE OBESITY (BMI 35.0-39.9) WITH COMORBIDITY (HCC): Primary | ICD-10-CM

## 2022-11-29 DIAGNOSIS — M54.50 CHRONIC MIDLINE LOW BACK PAIN WITHOUT SCIATICA: ICD-10-CM

## 2022-11-29 DIAGNOSIS — K21.9 CHRONIC GERD: ICD-10-CM

## 2022-11-29 PROCEDURE — G8417 CALC BMI ABV UP PARAM F/U: HCPCS | Performed by: SURGERY

## 2022-11-29 PROCEDURE — G8427 DOCREV CUR MEDS BY ELIG CLIN: HCPCS | Performed by: SURGERY

## 2022-11-29 PROCEDURE — 1036F TOBACCO NON-USER: CPT | Performed by: SURGERY

## 2022-11-29 PROCEDURE — 99214 OFFICE O/P EST MOD 30 MIN: CPT | Performed by: SURGERY

## 2022-11-29 PROCEDURE — G8484 FLU IMMUNIZE NO ADMIN: HCPCS | Performed by: SURGERY

## 2022-11-29 NOTE — PROGRESS NOTES
[facility]) Physicians   [facility] [employer]  Prescilla Gowers, MD, [facility], [address], [address] [facility] [address] . Phone: [phone]  Fax: [phone]          Chief Complaint   Patient presents with    Obesity     4th pre-surg         HPI:     Tiffany Portillo is a very pleasant 40 y.o. female with Body mass index is 39 kg/m². / Chronic Obesity. Naa [place] has been struggling for several years now with obesity. Ashley feels the weight is an obstacle to achieve and perform things in daily living as well risk on health. Patient  is very determined to lose weight and be healthy, and is working towards  surgical weight loss to achieve this goal. Pre-operative clearance and work up pending. Working hard to keep good dietary habits as well level of activity. Patient denies any nausea, vomiting, fevers, chills, shortness of breath, chest pain, cough, constipation or difficulty urinating. Pain Assessment   Denies any abdominal pain       History reviewed. No pertinent past medical history. Past Surgical History:   Procedure Laterality Date    ABDOMINOPLASTY      BREAST ENHANCEMENT SURGERY      HERNIA REPAIR      abd     History reviewed. No pertinent family history. Social History     Tobacco Use    Smoking status: Former     Types: Cigarettes     Quit date: 2022     Years since quittin.1    Smokeless tobacco: Never   Substance Use Topics    Alcohol use: Yes     Comment: occasional     I counseled the patient on the importance of not smoking and risks of ETOH. Allergies   Allergen Reactions    Cat Hair Extract     Dust Mite Extract     Seasonal      Vitals:    22 1019   BP: 122/64   Pulse: 95   Resp: 18   SpO2: 99%   Weight: 249 lb (112.9 kg)   Height: 5' 7\" (1.702 m)       Body mass index is 39 kg/m².     Lab Results   Component Value Date/Time    WBC 4.5 2022 10:29 AM    RBC 3.98 2022 10:29 AM    HGB 12.1 2022 10:29 AM    HCT 36.8 2022 10:29 AM    MCV 92.4 09/09/2022 10:29 AM    MCH 30.5 09/09/2022 10:29 AM    MCHC 33.0 09/09/2022 10:29 AM    MPV 7.8 09/09/2022 10:29 AM    NEUTOPHILPCT 55.8 09/09/2022 10:29 AM    LYMPHOPCT 32.2 09/09/2022 10:29 AM    MONOPCT 8.0 09/09/2022 10:29 AM    EOSRELPCT 3.7 09/09/2022 10:29 AM    BASOPCT 0.3 09/09/2022 10:29 AM    NEUTROABS 2.5 09/09/2022 10:29 AM    LYMPHSABS 1.4 09/09/2022 10:29 AM    MONOSABS 0.4 09/09/2022 10:29 AM    EOSABS 0.2 09/09/2022 10:29 AM     Lab Results   Component Value Date/Time     09/09/2022 10:29 AM    K 4.0 09/09/2022 10:29 AM    K 3.8 12/14/2020 06:00 AM     09/09/2022 10:29 AM    CO2 22 09/09/2022 10:29 AM    ANIONGAP 11 09/09/2022 10:29 AM    GLUCOSE 81 09/09/2022 10:29 AM    BUN 7 09/09/2022 10:29 AM    CREATININE 0.7 09/09/2022 10:29 AM    LABGLOM >60 09/09/2022 10:29 AM    GFRAA >60 09/09/2022 10:29 AM    CALCIUM 8.8 09/09/2022 10:29 AM    PROT 7.3 09/09/2022 10:29 AM    LABALBU 3.4 09/09/2022 10:29 AM    AGRATIO 0.9 09/09/2022 10:29 AM    BILITOT 0.4 09/09/2022 10:29 AM    ALKPHOS 97 09/09/2022 10:29 AM    ALT 14 09/09/2022 10:29 AM    AST 20 09/09/2022 10:29 AM    GLOB 3.9 12/13/2020 09:59 AM     Lab Results   Component Value Date/Time    CHOL 165 09/09/2022 10:29 AM    TRIG 47 09/09/2022 10:29 AM    HDL 62 09/09/2022 10:29 AM    LDLCALC 94 09/09/2022 10:29 AM    LABVLDL 9 09/09/2022 10:29 AM     Lab Results   Component Value Date/Time    TSHREFLEX 2.19 09/09/2022 10:29 AM     Lab Results   Component Value Date/Time    IRON 68 09/09/2022 10:29 AM    TIBC 282 09/09/2022 10:29 AM    LABIRON 24 09/09/2022 10:29 AM     Lab Results   Component Value Date/Time    KFIMORVX87 408 09/09/2022 10:29 AM    FOLATE 4.37 09/09/2022 10:29 AM     Lab Results   Component Value Date/Time    VITD25 12.3 09/09/2022 10:29 AM     Lab Results   Component Value Date/Time    LABA1C 5.1 09/09/2022 10:29 AM    EAG 99.7 09/09/2022 10:29 AM         Current Outpatient Medications: Cholecalciferol 50 MCG (2000 UT) TABS, Take 1 tablet by mouth daily Take 1 tablet by mouth daily. Start this medication after you finish the weekly regimen, Disp: 90 tablet, Rfl: 2    vitamin D (CHOLECALCIFEROL) 27025 UNIT CAPS, Take 1 capsule by mouth once a week, Disp: 12 capsule, Rfl: 0    Review of Systems - History obtained from the patient  General ROS: negative  Psychological ROS: negative  Ophthalmic ROS: negative  Neurological ROS: negative  ENT ROS: negative  Allergy and Immunology ROS: negative  Hematological and Lymphatic ROS: negative  Endocrine ROS: negative  Breast ROS: negative  Respiratory ROS: negative  Cardiovascular ROS: negative  Gastrointestinal ROS:negative  Genito-Urinary ROS: negative  Musculoskeletal ROS: negative   Skin ROS: negative    Physical Exam   Vitals Reviewed   Constitutional: Patient is oriented to person, place, and time. Patient appears well-developed and well-nourished. Patient is active and cooperative. Non-toxic appearance. No distress. HENT:   Head: Normocephalic and atraumatic. Head is without abrasion and without laceration. Hair is normal.   Right Ear: External ear normal. No lacerations. No drainage, swelling . Left Ear: External ear normal. No lacerations. No drainage, swelling. Nose/Mouth: face mask in place  Eyes: Conjunctivae, EOM and lids are normal. Right eye exhibits no discharge. No foreign body present in the right eye. Left eye exhibits no discharge. No foreign body present in the left eye. No scleral icterus. Neck: Trachea normal and normal range of motion. No JVD present. Pulmonary/Chest: Effort normal. No accessory muscle usage or stridor. No apnea. No respiratory distress. Cardiovascular: Normal rate and no JVD. Abdominal: Normal appearance. Patient exhibits no distension. Abdomen is soft, obese, non tender. Musculoskeletal: Normal range of motion. Patient exhibits no edema.    Neurological: Patient is alert and oriented to person, place, and time. Patient has normal strength. GCS eye subscore is 4. GCS verbal subscore is 5. GCS motor subscore is 6. Skin: Skin is warm and dry. No abrasion and no rash noted. Patient is not diaphoretic. No cyanosis or erythema. Psychiatric: Patient has a normal mood and affect. Speech is normal and behavior is normal. Cognition and memory are normal.       A/P    Darren Cease is 40 y.o. female, Body mass index is 39 kg/m². pre surgery, has stable weight  since last visit. The patient underwent extensive dietary counseling with registered dietician. I have reviewed, discussed and agree with the dietary plan. Patient is trying hard to keep good dietary and behavior modifications. Patient is monitoring portion sizes, food choices and liquid calories. Patient is trying to exercise regularly as much as possible. Obesity as a disease is considered a high risk to patients overall health and should therefore be considered a high risk disease state. Advised the patient that not getting there weight under control, that could increase risk of complications/worsening of those conditions on the long-term. (Goal of weight loss surgery is to alleviate/control some of those co-morbidities)    Now with Covid-19 pandemic, CDC and health authorities does classify obese patients as vulnerable and high risk as well. Which makes weight loss a priority for improvement of their wellbeing and overall health. I encouraged the patient to continue exercise and keeping healthy eating habits. Discussed pre-op labs and work up till now. Also counseled the patient extensively on Surgery. I did explain thoroughly to the patient that compliance with pre- and post op diet and other recommendations are integral part to improve the chances of successful weight loss and also not following it could end with serious health complications.    Some strategies discussed today include but not limited to : 30/30/30 minutes rule, food diary, avoid fast food and packing/planing ahead, & increasing exercise. Also stressed to the patient importance of taking the multivitamins as instructed, otherwise risk significant complications. The visit today, included any number of the following: Bariatric Preoperative work up/protocols, review of labs, imaging, provider notes, outside hospital records, performing examination/evaluation, counseling patient and/or family, ordering medications/tests, placing referrals and communication with referring physicians, coordination of care; discussing dietary plan/recall with the patient as well with registered dietitian and documentation in the EHR. Of note, the above was done during same day of the actual patient encounter. Ashley here for her fourth. Surgical visit. Patient still working her preoperative clearances. We will see the patient next month for continued follow-up. We discussed how her excess weight affects her overall health and importance of weight loss, healthy diet and active lifestyle to alleviate those co morbid conditions, otherwise risk deterioration. Morbid Obesity: Body mass index is 39 kg/m². [x] Continue to make dietary and lifestyle modifications. [x] Plan for Future laparoscopic sleeve gastrectomy. [x] Return for follow-up next month. Chronic GERD:   [x] Continue to make dietary and lifestyle modifications. [] Continue Omeprazole. [] Continue Famotidine. [x] Plan for EGD to evaluate the stomach. .    Chronic midline low back pain without sciatica:  [x] Continue to make dietary and lifestyle modifications. [x] Continue with weight loss. Patient advised that its their responsibility to follow up for studies, referrals and/or labs ordered today.

## 2022-11-29 NOTE — PROGRESS NOTES
Debbie Guzman gained 1 lbs over 1 month. Pt felt Thanksgiving was a challenge - did a lot of cooking. Family was not supportive of health related goals. Tried to stick with greens and cabbage. Breakfast: 1.5 HB egg and pc of ham or premier protein shake    Snack: no    Lunch: salad w ham    Snack: no    Dinner: pc of turkey with greens    Snack: no    Is pt eating at least 4 times everyday? \"I'm not really a snack person\"      Is pt eating a lean protein source with all meals and snacks? yes she is    Has pt decreased their portions using the plate method? Small portions     Is pt choosing low fat/sugar free options? Changed to lite dressings    Is pt drinking at least 64 oz of clear liquids everyday? Water only 8 glasses     Has pt stopped drinking carbonation, caffeinated, and sugar sweetened beverages? ETOH 1x per month - wine on thanksgiving     Has pt sampled Un jury and/or Nectar protein? Ordered on amazon 2 weeks ago- hasn't tried yet . Encouraged to purchase grab and goes today     Support Group? Pt reports that she kept getting disconnected - needs to do it over - will sign up again today for another date    Participating in intentional exercise?  Walking new dog    Plan/Recommendations: try grab and go protein powders  Sign up for additional support group  Ensure getting in 4th protein based eating occasion    Handouts: copy of protein based snack ideas    Wade [place], MS, RD, LD

## 2022-12-02 NOTE — PROGRESS NOTES
Patient reached ___X_ yes  _____ no   VM instructions left ____ yes   phone number ________                                ____ no-office notified          Date __12/9/22_______  Time ___0900____  Arrival __0700  hosp-[facility]____    Nothing to eat or drink after midnight-follow your doctors prep instructions-this may include taking a second dose of your prep after midnight  Responsible adult 25 or older to stay on site while you are here-drive you home-stay with you after  Follow any instructions your doctors office has given you  Bring a complete list of all your medications and supplements including name,dose,how often taken the day of your procedure  If you normally take the following medications in the morning please do so the AM of your procedure with a small sip of water       Heart,blood pressure,seizure,thyroid or breathing medications-use your inhalers-bring any rescue inhalers with you DOS       DO NOT take blood pressure medications ending in \"dirk\" or \"pril\" the AM of procedure or evening prior  Dr Joy Villagomez patients are not to any medications the AM of surgery  Take half or your normal dose of any long acting insulins the night before your procedure-do not take any diabetic medications the AM of procedure  Follow your doctors instructions regarding stopping or taking  any blood thinners-if you do not have instructions-call them  Any questions call your doctor  Other ______________________________________________________________    [facility] POLICY(subject to change)             The current policy is 2 visitors per patient. There are no children allowed. Mask at discretion of facility. Visiting hours are 8a-8p. Overnight visitors will be at the discretion of the nurse. All policies are subject to change.

## 2022-12-07 ENCOUNTER — TELEPHONE (OUTPATIENT)
Dept: BARIATRICS/WEIGHT MGMT | Age: 42
End: 2022-12-07

## 2022-12-07 NOTE — TELEPHONE ENCOUNTER
Spoke with pt about egd on 12/09/2022. explained clear liquid diet 24 hours prior/nothing after midnight. Pt verbalized understanding. Thought tomorrow was the 9th- but told pt it was not.

## 2022-12-09 ENCOUNTER — HOSPITAL ENCOUNTER (OUTPATIENT)
Age: 42
Setting detail: OUTPATIENT SURGERY
Discharge: HOME | End: 2022-12-09
Attending: SURGERY | Admitting: SURGERY
Payer: COMMERCIAL

## 2022-12-09 ENCOUNTER — ANESTHESIA (OUTPATIENT)
Dept: ENDOSCOPY | Age: 42
End: 2022-12-09
Payer: COMMERCIAL

## 2022-12-09 ENCOUNTER — ANESTHESIA EVENT (OUTPATIENT)
Dept: ENDOSCOPY | Age: 42
End: 2022-12-09
Payer: COMMERCIAL

## 2022-12-09 VITALS
BODY MASS INDEX: 38.06 KG/M2 | HEIGHT: 68 IN | OXYGEN SATURATION: 100 % | RESPIRATION RATE: 16 BRPM | SYSTOLIC BLOOD PRESSURE: 133 MMHG | DIASTOLIC BLOOD PRESSURE: 97 MMHG | HEART RATE: 87 BPM | WEIGHT: 251.1 LBS | TEMPERATURE: 98.5 F

## 2022-12-09 DIAGNOSIS — K21.9 GASTROESOPHAGEAL REFLUX DISEASE, UNSPECIFIED WHETHER ESOPHAGITIS PRESENT: ICD-10-CM

## 2022-12-09 PROBLEM — I85.10 SECONDARY ESOPHAGEAL VARICES WITHOUT BLEEDING (HCC): Status: ACTIVE | Noted: 2022-12-09

## 2022-12-09 LAB — HCG(URINE) PREGNANCY TEST: NEGATIVE

## 2022-12-09 PROCEDURE — 2709999900 HC NON-CHARGEABLE SUPPLY: Performed by: SURGERY

## 2022-12-09 PROCEDURE — 6360000002 HC RX W HCPCS: Performed by: NURSE ANESTHETIST, CERTIFIED REGISTERED

## 2022-12-09 PROCEDURE — 3609012400 HC EGD TRANSORAL BIOPSY SINGLE/MULTIPLE: Performed by: SURGERY

## 2022-12-09 PROCEDURE — 43239 EGD BIOPSY SINGLE/MULTIPLE: CPT | Performed by: SURGERY

## 2022-12-09 PROCEDURE — 7100000011 HC PHASE II RECOVERY - ADDTL 15 MIN: Performed by: SURGERY

## 2022-12-09 PROCEDURE — 3700000000 HC ANESTHESIA ATTENDED CARE: Performed by: SURGERY

## 2022-12-09 PROCEDURE — 2500000003 HC RX 250 WO HCPCS: Performed by: NURSE ANESTHETIST, CERTIFIED REGISTERED

## 2022-12-09 PROCEDURE — 88305 TISSUE EXAM BY PATHOLOGIST: CPT

## 2022-12-09 PROCEDURE — 84703 CHORIONIC GONADOTROPIN ASSAY: CPT

## 2022-12-09 PROCEDURE — 7100000010 HC PHASE II RECOVERY - FIRST 15 MIN: Performed by: SURGERY

## 2022-12-09 PROCEDURE — 2580000003 HC RX 258: Performed by: SURGERY

## 2022-12-09 RX ORDER — SODIUM CHLORIDE 9 MG/ML
INJECTION, SOLUTION INTRAVENOUS CONTINUOUS
Status: DISCONTINUED | OUTPATIENT
Start: 2022-12-09 | End: 2022-12-09 | Stop reason: HOSPADM

## 2022-12-09 RX ORDER — LIDOCAINE HYDROCHLORIDE 20 MG/ML
INJECTION, SOLUTION INFILTRATION; PERINEURAL PRN
Status: DISCONTINUED | OUTPATIENT
Start: 2022-12-09 | End: 2022-12-09 | Stop reason: SDUPTHER

## 2022-12-09 RX ORDER — SODIUM CHLORIDE 9 MG/ML
INJECTION, SOLUTION INTRAVENOUS CONTINUOUS
Status: DISCONTINUED | OUTPATIENT
Start: 2022-12-09 | End: 2022-12-09 | Stop reason: SDUPTHER

## 2022-12-09 RX ORDER — OMEPRAZOLE 20 MG/1
20 CAPSULE, DELAYED RELEASE ORAL
Qty: 90 CAPSULE | Refills: 1 | Status: SHIPPED | OUTPATIENT
Start: 2022-12-09

## 2022-12-09 RX ORDER — PROPOFOL 10 MG/ML
INJECTION, EMULSION INTRAVENOUS PRN
Status: DISCONTINUED | OUTPATIENT
Start: 2022-12-09 | End: 2022-12-09 | Stop reason: SDUPTHER

## 2022-12-09 RX ORDER — KETAMINE HYDROCHLORIDE 10 MG/ML
INJECTION, SOLUTION INTRAMUSCULAR; INTRAVENOUS PRN
Status: DISCONTINUED | OUTPATIENT
Start: 2022-12-09 | End: 2022-12-09 | Stop reason: SDUPTHER

## 2022-12-09 RX ADMIN — LIDOCAINE HYDROCHLORIDE 100 MG: 20 INJECTION, SOLUTION INFILTRATION; PERINEURAL at 08:45

## 2022-12-09 RX ADMIN — SODIUM CHLORIDE: 9 INJECTION, SOLUTION INTRAVENOUS at 07:48

## 2022-12-09 RX ADMIN — KETAMINE HYDROCHLORIDE 25 MG: 10 INJECTION, SOLUTION INTRAMUSCULAR; INTRAVENOUS at 08:46

## 2022-12-09 RX ADMIN — KETAMINE HYDROCHLORIDE 25 MG: 10 INJECTION, SOLUTION INTRAMUSCULAR; INTRAVENOUS at 08:50

## 2022-12-09 RX ADMIN — PROPOFOL 100 MG: 10 INJECTION, EMULSION INTRAVENOUS at 08:50

## 2022-12-09 RX ADMIN — PROPOFOL 100 MG: 10 INJECTION, EMULSION INTRAVENOUS at 08:45

## 2022-12-09 ASSESSMENT — ENCOUNTER SYMPTOMS: SHORTNESS OF BREATH: 0

## 2022-12-09 ASSESSMENT — PAIN - FUNCTIONAL ASSESSMENT: PAIN_FUNCTIONAL_ASSESSMENT: 0-10

## 2022-12-09 NOTE — PROGRESS NOTES
Discharge instructions review with patient and friend Darryl Edwards. All home medications have been reviewed, pt v/u. Discharge instructions signed. Pt discharged via wheelchair. Pt discharged with all belongings. Darryl Edwards taking stable pt home.

## 2022-12-09 NOTE — H&P
[facility] [address] Physicians   [employer]  Attending Pre-operative History and Physical      DIAGNOSIS:  Obesity    INDICATION:  Pre-op    PROCEDURE:  EGD    CHIEF COMPLAINT:  Obesity    History Obtained From:  patient    HISTORY OF PRESENT ILLNESS:    The patient is a 40 y.o. female with significant past medical history of   Patient Active Problem List   Diagnosis    Acute pulmonary embolism with acute cor pulmonale (HCC)    Severe obesity (BMI 35.0-39. 9) with comorbidity ([address]    Chronic GERD    Chronic midline low back pain without sciatica      who presents for pre-op EGD    Past Medical History:        Diagnosis Date    H/O blood clots 2020    lung     Past Surgical History:        Procedure Laterality Date    ABDOMINOPLASTY      BREAST ENHANCEMENT SURGERY      HERNIA REPAIR      abd     Medications Prior to Admission:   Medications Prior to Admission: Cholecalciferol 50 MCG (2000 UT) TABS, Take 1 tablet by mouth daily Take 1 tablet by mouth daily. Start this medication after you finish the weekly regimen    Allergies:  Cat hair extract, Dust mite extract, and Seasonal    Social History:   TOBACCO:   reports that she quit smoking about 2 months ago. Her smoking use included cigarettes. She has never used smokeless tobacco.  ETOH:   reports current alcohol use. Family History:   History reviewed. No pertinent family history.       REVIEW OF SYSTEMS:    Review of Systems - History obtained from the patient  General ROS: negative  Psychological ROS: negative  Ophthalmic ROS: negative  Neurological ROS: negative  ENT ROS: negative  Allergy and Immunology ROS: negative  Hematological and Lymphatic ROS: negative  Endocrine ROS: negative  Breast ROS: negative  Respiratory ROS: negative  Cardiovascular ROS: negative  Gastrointestinal ROS:negative  Genito-Urinary ROS: negative  Musculoskeletal ROS: negative   Skin ROS: negative      PHYSICAL EXAM: /83   Pulse 98   Temp 97.8 °F (36.6 °C) (Temporal)   Resp 20   Ht 5' 8\" (1.727 m)   Wt 251 lb 1.6 oz (113.9 kg)   LMP 11/29/2022 (Approximate)   SpO2 98%   BMI 38.18 kg/m²  I      Physical Exam   Vitals Reviewed   Constitutional: Patient is oriented to person, place, and time. Patient appears well-developed and well-nourished. Patient is active and cooperative. Non-toxic appearance. No distress. HENT:   Head: Normocephalic and atraumatic. Head is without abrasion and without laceration. Hair is normal.   Right Ear: External ear normal. No lacerations. No drainage, swelling . Left Ear: External ear normal. No lacerations. No drainage, swelling. Nose: Nose normal. No nose lacerations or nasal deformity. Eyes: Conjunctivae, EOM and lids are normal. Right eye exhibits no discharge. No foreign body present in the right eye. Left eye exhibits no discharge. No foreign body present in the left eye. No scleral icterus. Neck: Trachea normal and normal range of motion. No JVD present. Pulmonary/Chest: Effort normal. No accessory muscle usage or stridor. No apnea. No respiratory distress. Cardiovascular: Normal rate and no JVD. Abdominal: Normal appearance. Patient exhibits no distension. Musculoskeletal: Normal range of motion. Patient exhibits no edema. Neurological: Patient is alert and oriented to person, place, and time. Patient has normal strength. GCS eye subscore is 4. GCS verbal subscore is 5. GCS motor subscore is 6. Skin: Skin is warm and dry. No abrasion and no rash noted. Patient is not diaphoretic. No cyanosis or erythema. Psychiatric: Patient has a normal mood and affect.  Speech is normal and behavior is normal. Cognition and memory are normal.       DATA:  CBC:   Lab Results   Component Value Date/Time    WBC 4.5 09/09/2022 10:29 AM    RBC 3.98 09/09/2022 10:29 AM    HGB 12.1 09/09/2022 10:29 AM    HCT 36.8 09/09/2022 10:29 AM    MCV 92.4 09/09/2022 10:29 AM    MCH 30.5 09/09/2022 10:29 AM    MCHC 33.0 09/09/2022 10:29 AM    RDW 15.2 09/09/2022 10:29 AM     09/09/2022 10:29 AM    MPV 7.8 09/09/2022 10:29 AM     CMP:    Lab Results   Component Value Date/Time     09/09/2022 10:29 AM    K 4.0 09/09/2022 10:29 AM    K 3.8 12/14/2020 06:00 AM     09/09/2022 10:29 AM    CO2 22 09/09/2022 10:29 AM    BUN 7 09/09/2022 10:29 AM    CREATININE 0.7 09/09/2022 10:29 AM    GFRAA >60 09/09/2022 10:29 AM    AGRATIO 0.9 09/09/2022 10:29 AM    LABGLOM >60 09/09/2022 10:29 AM    GLUCOSE 81 09/09/2022 10:29 AM    PROT 7.3 09/09/2022 10:29 AM    LABALBU 3.4 09/09/2022 10:29 AM    CALCIUM 8.8 09/09/2022 10:29 AM    BILITOT 0.4 09/09/2022 10:29 AM    ALKPHOS 97 09/09/2022 10:29 AM    AST 20 09/09/2022 10:29 AM    ALT 14 09/09/2022 10:29 AM       ASSESSMENT AND PLAN:      Obesity: Body mass index is 38.18 kg/m². [x] Continue to make dietary and lifestyle modifications. [x] Plan for Future laparoscopic sleeve gastrectomy. [x] Return for follow-up. Chronic GERD:   [x] Continue to make dietary and lifestyle modifications. [] Continue Omeprazole. [] Continue Famotidine. [x] Plan for EGD to evaluate the stomach. I did explain thoroughly to the patient that compliance with pre- and post op diet and other recommendations are integral part to improve the chances of successful weight loss and also not following it could end with serious health complications. Some strategies discussed today include but not limited to : 30/30/30 minutes rule, food diary, avoid fast food and packing/planing ahead, & increasing exercise. 1.  Patient is a 40 y.o. female with above specified procedure planned EGD with deep sedation  2. Procedure options, risks and benefits reviewed with patient. Patient expresses understanding.       Electronically signed by Zabrina English MD , FACS, [facility]  on 12/9/2022 at 8:26 AM

## 2022-12-09 NOTE — DISCHARGE INSTRUCTIONS
ENDOSCOPY DISCHARGE INSTRUCTIONS    You may experience some lightheadedness for the next several hours. Plan on quiet relaxation for the rest of today. A responsible adult needs to stay with you today. Because of the medications you received today-do not drive,operate machinery,or sign any contractual agreement for the next 24 hours. Do not drink any alcoholic beverages or take any unprescribed medications tonight. Eat bland food and avoid anything greasy or spicy initially-progress to your normal diet gradually. Diet restrictions as instructed. If you have any of the following problems, notify your physician or return to the hospital emergency room : fever, chills, excessive bleeding, excessive vomiting, difficulty swallowing, uncontrolled pain, increased abdominal distention, shortness of breath or any other problems. If you have a sore throat, you may use lozenges or salt water gargles. Please call Dr. Reji Garcia office in 5 business days for biopsy results [phone]. ANESTHESIA DISCHARGE INSTRUCTIONS    Wear your seatbelt home. You are under the influence of drugs-do not drink alcohol,drive,operate machinery,or make any important decisions or sign any legal documentsfor 24 hours  Children should not ride [employer] or play on gym sets  for 24 hours after surgery. A responsible adult needs to be with you for 24 hours. You may experience lightheadedness,dizziness,or sleepiness following surgery. Rest at home today- increase activity as tolerated. Progress slowly to a regular diet unless your physician has instructed you otherwise. Drink plenty of water. If nausea becomes a problem call your physician. Coughing,sore throat,and muscle aches are other side effects of anesthesia,and should improve with time. Do not drive,operate machinery while taking narcotics.

## 2022-12-09 NOTE — ANESTHESIA POSTPROCEDURE EVALUATION
Department of Anesthesiology  Postprocedure Note    Patient: Elly Mahoney  MRN: [id number]  YOB: 1980  Date of evaluation: 12/9/2022      Procedure Summary     Date: 12/09/22 Room / Location: [address] [place] [address]    Anesthesia Start: 4210 Anesthesia Stop: 3937    Procedure: EGD BIOPSY (Abdomen) Diagnosis:       Gastroesophageal reflux disease, unspecified whether esophagitis present      (Gastroesophageal reflux disease, unspecified whether esophagitis present [K21.9])    Surgeons: Heike Chavez MD Responsible Provider: Dinora Montes De Oca MD    Anesthesia Type: MAC ASA Status: 3          Anesthesia Type: No value filed. Ceci Phase I:      Ceci Phase II: Ceci Score: 10      Anesthesia Post Evaluation    Patient location during evaluation: PACU  Level of consciousness: awake  Airway patency: patent  Complications: no  Cardiovascular status: hemodynamically stable  Respiratory status: acceptable  There was medical reason for not using a multimodal analgesia pain management approach.

## 2022-12-09 NOTE — ANESTHESIA PRE PROCEDURE
Department of Anesthesiology  Preprocedure Note       Name:  Ochoa Torres   Age:  40 y.o.  :  1980                                          MRN:  [id number]         Date:  2022      Surgeon: Florencio Nichols): Blanca Basilio MD    Procedure: Procedure(s):  EGD DIAGNOSTIC ONLY    Medications prior to admission:   Prior to Admission medications    Medication Sig Start Date End Date Taking? Authorizing Provider   Cholecalciferol 50 MCG ( UT) TABS Take 1 tablet by mouth daily Take 1 tablet by mouth daily. Start this medication after you finish the weekly regimen 22   Blanca Basilio MD       Current medications:    Current Facility-Administered Medications   Medication Dose Route Frequency Provider Last Rate Last Admin    0.9 % sodium chloride infusion   IntraVENous Continuous Blanca Basilio MD           Allergies: Allergies   Allergen Reactions    Cat Hair Extract     Dust Mite Extract     Seasonal        Problem List:    Patient Active Problem List   Diagnosis Code    Acute pulmonary embolism with acute cor pulmonale ([facility]) I26.09    Severe obesity (BMI 35.0-39. 9) with comorbidity ([facility]) E66.01    Chronic GERD K21.9    Chronic midline low back pain without sciatica M54.50, G89.29       Past Medical History:        Diagnosis Date    H/O blood clots 2020    lung       Past Surgical History:        Procedure Laterality Date    ABDOMINOPLASTY      BREAST ENHANCEMENT SURGERY      HERNIA REPAIR      abd       Social History:    Social History     Tobacco Use    Smoking status: Former     Types: Cigarettes     Quit date: 2022     Years since quittin.1    Smokeless tobacco: Never   Substance Use Topics    Alcohol use: Yes     Comment: occasional                                Counseling given: Not Answered      Vital Signs (Current):   Vitals:    22 1429   Weight: 243 lb (110.2 kg)   Height: 5' 7\" (1.702 m)                                              BP Readings from Last 3 Encounters:   11/29/22 122/64   10/18/22 124/72   09/19/22 102/64       NPO Status:                                                                                 BMI:   Wt Readings from Last 3 Encounters:   12/02/22 243 lb (110.2 kg)   11/29/22 249 lb (112.9 kg)   10/18/22 248 lb (112.5 kg)     Body mass index is 38.06 kg/m². CBC:   Lab Results   Component Value Date/Time    WBC 4.5 09/09/2022 10:29 AM    RBC 3.98 09/09/2022 10:29 AM    HGB 12.1 09/09/2022 10:29 AM    HCT 36.8 09/09/2022 10:29 AM    MCV 92.4 09/09/2022 10:29 AM    RDW 15.2 09/09/2022 10:29 AM     09/09/2022 10:29 AM       CMP:   Lab Results   Component Value Date/Time     09/09/2022 10:29 AM    K 4.0 09/09/2022 10:29 AM    K 3.8 12/14/2020 06:00 AM     09/09/2022 10:29 AM    CO2 22 09/09/2022 10:29 AM    BUN 7 09/09/2022 10:29 AM    CREATININE 0.7 09/09/2022 10:29 AM    GFRAA >60 09/09/2022 10:29 AM    AGRATIO 0.9 09/09/2022 10:29 AM    LABGLOM >60 09/09/2022 10:29 AM    GLUCOSE 81 09/09/2022 10:29 AM    PROT 7.3 09/09/2022 10:29 AM    CALCIUM 8.8 09/09/2022 10:29 AM    BILITOT 0.4 09/09/2022 10:29 AM    ALKPHOS 97 09/09/2022 10:29 AM    AST 20 09/09/2022 10:29 AM    ALT 14 09/09/2022 10:29 AM       POC Tests: No results for input(s): POCGLU, POCNA, POCK, POCCL, POCBUN, POCHEMO, POCHCT in the last 72 hours.     Coags:   Lab Results   Component Value Date/Time    PROTIME 13.7 09/09/2022 10:29 AM    INR 1.06 09/09/2022 10:29 AM    APTT 64.8 12/14/2020 12:18 PM       HCG (If Applicable):   Lab Results   Component Value Date    PREGTESTUR Negative 07/19/2017        ABGs: No results found for: PHART, PO2ART, KID3IRX, PUQ0BNC, BEART, W0ZUTRPN     Type & Screen (If Applicable):  No results found for: LABABO, LABRH    Drug/Infectious Status (If Applicable):  No results found for: HIV, HEPCAB    COVID-19 Screening (If Applicable): No results found for: COVID19        Anesthesia Evaluation  Patient summary reviewed and Nursing notes reviewed no history of anesthetic complications:   Airway: Mallampati: I  TM distance: >3 FB   Neck ROM: full  Mouth opening: > = 3 FB   Dental: normal exam         Pulmonary:       (-) asthma and shortness of breath                           Cardiovascular:        (-) hypertension and  angina                Neuro/Psych:      (-) CVA           GI/Hepatic/Renal:   (+) GERD:,      (-) liver disease       Endo/Other:        (-) diabetes mellitus, hypothyroidism               Abdominal:             Vascular:   + DVT, PE.  - PVD. Other Findings:           Anesthesia Plan      MAC     ASA 3       Induction: intravenous. Anesthetic plan and risks discussed with patient. Plan discussed with CRNA.                     Alhaji Carroll MD   12/9/2022

## 2022-12-12 ENCOUNTER — TELEPHONE (OUTPATIENT)
Dept: BARIATRICS/WEIGHT MGMT | Age: 42
End: 2022-12-12

## 2022-12-12 NOTE — TELEPHONE ENCOUNTER
Patient reaching out regarding EGD results. States that provider had some concerns during EGD and patient states she was told to reach out to office regarding this matter. Patient would like to know next steps.

## 2023-01-17 ENCOUNTER — OFFICE VISIT (OUTPATIENT)
Dept: BARIATRICS/WEIGHT MGMT | Age: 43
End: 2023-01-17
Payer: COMMERCIAL

## 2023-01-17 VITALS
HEIGHT: 68 IN | BODY MASS INDEX: 39.1 KG/M2 | WEIGHT: 258 LBS | HEART RATE: 80 BPM | RESPIRATION RATE: 18 BRPM | OXYGEN SATURATION: 97 % | DIASTOLIC BLOOD PRESSURE: 84 MMHG | SYSTOLIC BLOOD PRESSURE: 117 MMHG

## 2023-01-17 DIAGNOSIS — I85.10 SECONDARY ESOPHAGEAL VARICES WITHOUT BLEEDING (HCC): ICD-10-CM

## 2023-01-17 DIAGNOSIS — K21.9 CHRONIC GERD: ICD-10-CM

## 2023-01-17 DIAGNOSIS — G89.29 CHRONIC MIDLINE LOW BACK PAIN WITHOUT SCIATICA: ICD-10-CM

## 2023-01-17 DIAGNOSIS — M54.50 CHRONIC MIDLINE LOW BACK PAIN WITHOUT SCIATICA: ICD-10-CM

## 2023-01-17 DIAGNOSIS — E66.01 SEVERE OBESITY (BMI 35.0-39.9) WITH COMORBIDITY (HCC): Primary | ICD-10-CM

## 2023-01-17 PROCEDURE — 1036F TOBACCO NON-USER: CPT | Performed by: SURGERY

## 2023-01-17 PROCEDURE — G8427 DOCREV CUR MEDS BY ELIG CLIN: HCPCS | Performed by: SURGERY

## 2023-01-17 PROCEDURE — G8484 FLU IMMUNIZE NO ADMIN: HCPCS | Performed by: SURGERY

## 2023-01-17 PROCEDURE — G8417 CALC BMI ABV UP PARAM F/U: HCPCS | Performed by: SURGERY

## 2023-01-17 PROCEDURE — 99214 OFFICE O/P EST MOD 30 MIN: CPT | Performed by: SURGERY

## 2023-01-17 NOTE — PROGRESS NOTES
Karli Hoffmanter [address] gained 9 lbs over past 6 weeks. Pt finds herself eating more since starting omeprazole which she feels like is making her hungrier. Is pt eating at least 4 times everyday? Yes     Is pt eating a lean protein source with all meals and snacks? Not always lean   [place]  with fried chicken OR salad with turkey/grilled salmon OR premier protein shake OR 2 fried wings with fries     Has pt decreased their portions using the plate method? Reports portions are okay     Is pt choosing low fat/sugar free options? No - fried foods     Is pt drinking at least 64 oz of clear liquids everyday? 48 - 64 oz     Has pt stopped drinking carbonation, caffeinated, and sugar sweetened beverages? No -  Drinks water, regular gatorade - states zero is too sweet     Has pt sampled Unjury and/or Nectar protein? Not yet, discussed today     Has pt attended a support group? Needs to get rescheduled for another group - email verified - reports not getting the links but pt did check email in room and did have email     Participating in intentional exercise?  Walking the dog a lot + going to gym 3 x week (hasn't been in the last 2 weeks)     Plan/Recommendations:   Avoid fried foods   Can increase fluids to 64 oz per day   Signed up for 1/23 SG - email verified   Not yet, discussed today     Handouts: none     Aamir Eugene RD, LD

## 2023-01-17 NOTE — PROGRESS NOTES
[facility]) Physicians   [employer] [facility] [employer]  Manuel Lewis MD, [facility], [address], [address] [facility]    [place]  [address] . Phone: [phone]  Fax: [phone]          Chief Complaint   Patient presents with    Obesity     5th pre-surg         HPI:     Jewels Tran is a very pleasant 40 y.o. female with Body mass index is 39.23 kg/m². / Chronic Obesity. Mariam Spatz has been struggling for several years now with obesity. Ashley feels the weight is an obstacle to achieve and perform things in daily living as well risk on health. Patient  is very determined to lose weight and be healthy, and is working towards  surgical weight loss to achieve this goal. Pre-operative clearance and work up pending. Working hard to keep good dietary habits as well level of activity. Patient denies any nausea, vomiting, fevers, chills, shortness of breath, chest pain, cough, constipation or difficulty urinating. Pain Assessment   Denies any abdominal pain       Past Medical History:   Diagnosis Date    H/O blood clots     lung     Past Surgical History:   Procedure Laterality Date    ABDOMINOPLASTY      BREAST ENHANCEMENT SURGERY      HERNIA REPAIR      abd    UPPER GASTROINTESTINAL ENDOSCOPY N/A 2022    EGD BIOPSY performed by Harper Bahena MD at [address] [place] [address]     History reviewed. No pertinent family history. Social History     Tobacco Use    Smoking status: Former     Types: Cigarettes     Quit date: 2022     Years since quittin.3    Smokeless tobacco: Never   Substance Use Topics    Alcohol use: Yes     Comment: occasional     I counseled the patient on the importance of not smoking and risks of ETOH. Allergies   Allergen Reactions    Cat Hair Extract      cats    Dust Mite Extract     Seasonal      Vitals:    23 1124   BP: 117/84   Pulse: 80   Resp: 18   SpO2: 97%   Weight: 258 lb (117 kg)   Height: 5' 8\" (1.727 m)       Body mass index is 39.23 kg/m².     Lab Results   Component Value Date/Time    WBC 4.5 09/09/2022 10:29 AM    RBC 3.98 09/09/2022 10:29 AM    HGB 12.1 09/09/2022 10:29 AM    HCT 36.8 09/09/2022 10:29 AM    MCV 92.4 09/09/2022 10:29 AM    MCH 30.5 09/09/2022 10:29 AM    MCHC 33.0 09/09/2022 10:29 AM    MPV 7.8 09/09/2022 10:29 AM    NEUTOPHILPCT 55.8 09/09/2022 10:29 AM    LYMPHOPCT 32.2 09/09/2022 10:29 AM    MONOPCT 8.0 09/09/2022 10:29 AM    EOSRELPCT 3.7 09/09/2022 10:29 AM    BASOPCT 0.3 09/09/2022 10:29 AM    NEUTROABS 2.5 09/09/2022 10:29 AM    LYMPHSABS 1.4 09/09/2022 10:29 AM    MONOSABS 0.4 09/09/2022 10:29 AM    EOSABS 0.2 09/09/2022 10:29 AM     Lab Results   Component Value Date/Time     09/09/2022 10:29 AM    K 4.0 09/09/2022 10:29 AM    K 3.8 12/14/2020 06:00 AM     09/09/2022 10:29 AM    CO2 22 09/09/2022 10:29 AM    ANIONGAP 11 09/09/2022 10:29 AM    GLUCOSE 81 09/09/2022 10:29 AM    BUN 7 09/09/2022 10:29 AM    CREATININE 0.7 09/09/2022 10:29 AM    LABGLOM >60 09/09/2022 10:29 AM    GFRAA >60 09/09/2022 10:29 AM    CALCIUM 8.8 09/09/2022 10:29 AM    PROT 7.3 09/09/2022 10:29 AM    LABALBU 3.4 09/09/2022 10:29 AM    AGRATIO 0.9 09/09/2022 10:29 AM    BILITOT 0.4 09/09/2022 10:29 AM    ALKPHOS 97 09/09/2022 10:29 AM    ALT 14 09/09/2022 10:29 AM    AST 20 09/09/2022 10:29 AM    GLOB 3.9 12/13/2020 09:59 AM     Lab Results   Component Value Date/Time    CHOL 165 09/09/2022 10:29 AM    TRIG 47 09/09/2022 10:29 AM    HDL 62 09/09/2022 10:29 AM    LDLCALC 94 09/09/2022 10:29 AM    LABVLDL 9 09/09/2022 10:29 AM     Lab Results   Component Value Date/Time    TSHREFLEX 2.19 09/09/2022 10:29 AM     Lab Results   Component Value Date/Time    IRON 68 09/09/2022 10:29 AM    TIBC 282 09/09/2022 10:29 AM    LABIRON 24 09/09/2022 10:29 AM     Lab Results   Component Value Date/Time    NTORJHBF72 408 09/09/2022 10:29 AM    FOLATE 4.37 09/09/2022 10:29 AM     Lab Results   Component Value Date/Time    VITD25 12.3 09/09/2022 10:29 AM     Lab Results   Component Value Date/Time    LABA1C 5.1 09/09/2022 10:29 AM    EAG 99.7 09/09/2022 10:29 AM         Current Outpatient Medications:     Cholecalciferol 50 MCG (2000 UT) TABS, Take 1 tablet by mouth daily Take 1 tablet by mouth daily. Start this medication after you finish the weekly regimen, Disp: 90 tablet, Rfl: 2    omeprazole (PRILOSEC) 20 MG delayed release capsule, Take 1 capsule by mouth every morning (before breakfast), Disp: 90 capsule, Rfl: 1    Review of Systems - History obtained from the patient  General ROS: negative  Psychological ROS: negative  Ophthalmic ROS: negative  Neurological ROS: negative  ENT ROS: negative  Allergy and Immunology ROS: negative  Hematological and Lymphatic ROS: negative  Endocrine ROS: negative  Breast ROS: negative  Respiratory ROS: negative  Cardiovascular ROS: negative  Gastrointestinal ROS:negative  Genito-Urinary ROS: negative  Musculoskeletal ROS: negative   Skin ROS: negative    Physical Exam   Vitals Reviewed   Constitutional: Patient is oriented to person, place, and time. Patient appears well-developed and well-nourished. Patient is active and cooperative. Non-toxic appearance. No distress. HENT:   Head: Normocephalic and atraumatic. Head is without abrasion and without laceration. Hair is normal.   Right Ear: External ear normal. No lacerations. No drainage, swelling . Left Ear: External ear normal. No lacerations. No drainage, swelling. Nose/Mouth: face mask in place  Eyes: Conjunctivae, EOM and lids are normal. Right eye exhibits no discharge. No foreign body present in the right eye. Left eye exhibits no discharge. No foreign body present in the left eye. No scleral icterus. Neck: Trachea normal and normal range of motion. No JVD present. Pulmonary/Chest: Effort normal. No accessory muscle usage or stridor. No apnea. No respiratory distress. Cardiovascular: Normal rate and no JVD. Abdominal: Normal appearance.  Patient exhibits no distension. Abdomen is soft, obese, non tender. Musculoskeletal: Normal range of motion. Patient exhibits no edema. Neurological: Patient is alert and oriented to person, place, and time. Patient has normal strength. GCS eye subscore is 4. GCS verbal subscore is 5. GCS motor subscore is 6. Skin: Skin is warm and dry. No abrasion and no rash noted. Patient is not diaphoretic. No cyanosis or erythema. Psychiatric: Patient has a normal mood and affect. Speech is normal and behavior is normal. Cognition and memory are normal.       A/P    Grace Bowling is 40 y.o. female, Body mass index is 39.23 kg/m². pre surgery, has gained 9 lbs since last visit. The patient underwent extensive dietary counseling with registered dietician. I have reviewed, discussed and agree with the dietary plan. Patient is trying hard to keep good dietary and behavior modifications. Patient is monitoring portion sizes, food choices and liquid calories. Patient is trying to exercise regularly as much as possible. Obesity as a disease is considered a high risk to patients overall health and should therefore be considered a high risk disease state. Advised the patient that not getting there weight under control, that could increase risk of complications/worsening of those conditions on the long-term. (Goal of weight loss surgery is to alleviate/control some of those co-morbidities)    Now with Covid-19 pandemic, CDC and health authorities does classify obese patients as vulnerable and high risk as well. Which makes weight loss a priority for improvement of their wellbeing and overall health. I encouraged the patient to continue exercise and keeping healthy eating habits. Discussed pre-op labs and work up till now. Also counseled the patient extensively on Surgery.      I did explain thoroughly to the patient that compliance with pre- and post op diet and other recommendations are integral part to improve the chances of successful weight loss and also not following it could end with serious health complications. Some strategies discussed today include but not limited to : 30/30/30 minutes rule, food diary, avoid fast food and packing/planing ahead, & increasing exercise. Also stressed to the patient importance of taking the multivitamins as instructed, otherwise risk significant complications. The visit today, included any number of the following: Bariatric Preoperative work up/protocols, review of labs, imaging, provider notes, outside hospital records, performing examination/evaluation, counseling patient and/or family, ordering medications/tests, placing referrals and communication with referring physicians, coordination of care; discussing dietary plan/recall with the patient as well with registered dietitian and documentation in the EHR. Of note, the above was done during same day of the actual patient encounter. Cheli Romero is here for her fifth presurgical visit. Patient on her endoscopy and there was concern of multiple varices. Patient does not have any known liver disease and for that reason I would refer her to GI hematology for further work-up. I think surgery needs to be on hold for now. Discussed with the patient that we will see her back in 3 months and at that point we will decide if surgery is an option or not and if not then we will refer her to the medical weight loss program.       We discussed how her excess weight affects her overall health and importance of weight loss, healthy diet and active lifestyle to alleviate those co morbid conditions, otherwise risk deterioration. Severe Obesity: Body mass index is 39.23 kg/m². [x] Continue to make dietary and lifestyle modifications. [x] Plan for [facility]. [x] Return for follow-up in 3 months. Chronic GERD:   [x] Continue to make dietary and lifestyle modifications. [x] Continue Omeprazole. [] Continue Famotidine.   [] Plan for EGD to evaluate the stomach. Chronic midline low back pain without sciatica:  [x] Continue to make dietary and lifestyle modifications. [x] Continue with weight loss. Patient advised that its their responsibility to follow up for studies, referrals and/or labs ordered today.

## 2023-01-30 ENCOUNTER — HOSPITAL ENCOUNTER (OUTPATIENT)
Dept: ULTRASOUND IMAGING | Age: 43
Discharge: HOME | End: 2023-01-28

## 2023-01-30 DIAGNOSIS — R93.2 NONVISUALIZATION OF GALLBLADDER: ICD-10-CM

## 2023-01-31 ENCOUNTER — PATIENT MESSAGE (OUTPATIENT)
Dept: FAMILY MEDICINE CLINIC | Age: 43
End: 2023-01-31

## 2023-01-31 NOTE — TELEPHONE ENCOUNTER
From: Alexus Ceja  To: Dr. Primitivo Ortiz: 1/31/2023 10:14 AM EST  Subject: Question regarding US Abdomen Limited    i dont know what none of this means

## 2023-02-14 ENCOUNTER — HOSPITAL ENCOUNTER (OUTPATIENT)
Dept: ULTRASOUND IMAGING | Age: 43
Discharge: HOME | End: 2023-02-14
Payer: COMMERCIAL

## 2023-02-14 DIAGNOSIS — R93.2 ABNORMAL CT SCAN, LIVER: ICD-10-CM

## 2023-02-14 DIAGNOSIS — K76.6 PORTAL HYPERTENSION (HCC): ICD-10-CM

## 2023-02-14 PROCEDURE — 76705 ECHO EXAM OF ABDOMEN: CPT

## 2023-02-14 PROCEDURE — 93975 VASCULAR STUDY: CPT

## 2023-02-17 ENCOUNTER — HOSPITAL ENCOUNTER (OUTPATIENT)
Age: 43
Discharge: HOME | End: 2023-02-17
Payer: COMMERCIAL

## 2023-02-17 LAB
BASOPHILS ABSOLUTE: 0 K/UL (ref 0–0.2)
BASOPHILS RELATIVE PERCENT: 0.9 %
EOSINOPHILS ABSOLUTE: 0.2 K/UL (ref 0–0.6)
EOSINOPHILS RELATIVE PERCENT: 4.4 %
HCT VFR BLD CALC: 36.3 % (ref 36–48)
HEMOGLOBIN: 11.7 G/DL (ref 12–16)
INR BLD: 1.07 (ref 0.87–1.14)
LYMPHOCYTES ABSOLUTE: 1.6 K/UL (ref 1–5.1)
LYMPHOCYTES RELATIVE PERCENT: 30.1 %
MCH RBC QN AUTO: 29.4 PG (ref 26–34)
MCHC RBC AUTO-ENTMCNC: 32.3 G/DL (ref 31–36)
MCV RBC AUTO: 90.9 FL (ref 80–100)
MONOCYTES ABSOLUTE: 0.4 K/UL (ref 0–1.3)
MONOCYTES RELATIVE PERCENT: 7.7 %
NEUTROPHILS ABSOLUTE: 2.9 K/UL (ref 1.7–7.7)
NEUTROPHILS RELATIVE PERCENT: 56.9 %
PDW BLD-RTO: 15.6 % (ref 12.4–15.4)
PLATELET # BLD: 200 K/UL (ref 135–450)
PMV BLD AUTO: 8.2 FL (ref 5–10.5)
PROTHROMBIN TIME: 13.8 SEC (ref 11.7–14.5)
RBC # BLD: 3.99 M/UL (ref 4–5.2)
WBC # BLD: 5.2 K/UL (ref 4–11)

## 2023-02-17 PROCEDURE — 82728 ASSAY OF FERRITIN: CPT

## 2023-02-17 PROCEDURE — 86803 HEPATITIS C AB TEST: CPT

## 2023-02-17 PROCEDURE — 87340 HEPATITIS B SURFACE AG IA: CPT

## 2023-02-17 PROCEDURE — 85025 COMPLETE CBC W/AUTO DIFF WBC: CPT

## 2023-02-17 PROCEDURE — 86015 ACTIN ANTIBODY EACH: CPT

## 2023-02-17 PROCEDURE — 83516 IMMUNOASSAY NONANTIBODY: CPT

## 2023-02-17 PROCEDURE — 86038 ANTINUCLEAR ANTIBODIES: CPT

## 2023-02-17 PROCEDURE — 86708 HEPATITIS A ANTIBODY: CPT

## 2023-02-17 PROCEDURE — 82103 ALPHA-1-ANTITRYPSIN TOTAL: CPT

## 2023-02-17 PROCEDURE — 83540 ASSAY OF IRON: CPT

## 2023-02-17 PROCEDURE — 82104 ALPHA-1-ANTITRYPSIN PHENO: CPT

## 2023-02-17 PROCEDURE — 80053 COMPREHEN METABOLIC PANEL: CPT

## 2023-02-17 PROCEDURE — 82390 ASSAY OF CERULOPLASMIN: CPT

## 2023-02-17 PROCEDURE — 84466 ASSAY OF TRANSFERRIN: CPT

## 2023-02-17 PROCEDURE — 82105 ALPHA-FETOPROTEIN SERUM: CPT

## 2023-02-17 PROCEDURE — 86706 HEP B SURFACE ANTIBODY: CPT

## 2023-02-17 PROCEDURE — 85610 PROTHROMBIN TIME: CPT

## 2023-02-17 PROCEDURE — 36415 COLL VENOUS BLD VENIPUNCTURE: CPT

## 2023-02-18 LAB
A/G RATIO: 0.9 (ref 1.1–2.2)
ALBUMIN SERPL-MCNC: 3.5 G/DL (ref 3.4–5)
ALP BLD-CCNC: 91 U/L (ref 40–129)
ALT SERPL-CCNC: 13 U/L (ref 10–40)
ANION GAP SERPL CALCULATED.3IONS-SCNC: 11 MMOL/L (ref 3–16)
ANTI-NUCLEAR ANTIBODY (ANA): NEGATIVE
AST SERPL-CCNC: 17 U/L (ref 15–37)
BILIRUB SERPL-MCNC: 0.3 MG/DL (ref 0–1)
BUN BLDV-MCNC: 10 MG/DL (ref 7–20)
CALCIUM SERPL-MCNC: 9 MG/DL (ref 8.3–10.6)
CHLORIDE BLD-SCNC: 104 MMOL/L (ref 99–110)
CO2: 21 MMOL/L (ref 21–32)
CREAT SERPL-MCNC: 0.8 MG/DL (ref 0.6–1.1)
FERRITIN: 145.8 NG/ML (ref 15–150)
GFR SERPL CREATININE-BSD FRML MDRD: >60 ML/MIN/{1.73_M2}
GLUCOSE BLD-MCNC: 82 MG/DL (ref 70–99)
HBV SURFACE AB TITR SER: >1000 MIU/ML
HEPATITIS B SURFACE ANTIGEN INTERPRETATION: NORMAL
IRON SATURATION: 18 % (ref 15–50)
IRON: 55 UG/DL (ref 37–145)
POTASSIUM SERPL-SCNC: 3.8 MMOL/L (ref 3.5–5.1)
SODIUM BLD-SCNC: 136 MMOL/L (ref 136–145)
TOTAL IRON BINDING CAPACITY: 313 UG/DL (ref 260–445)
TOTAL PROTEIN: 7.6 G/DL (ref 6.4–8.2)
TRANSFERRIN: 288 MG/DL (ref 200–360)

## 2023-02-19 LAB
HAV AB SERPL IA-ACNC: NEGATIVE
HEPATITIS C VIRUS AB BY CIA INDEX: 0.05 IV
HEPATITIS C VIRUS AB BY CIA INTERPRETATION: NEGATIVE

## 2023-03-07 ENCOUNTER — OFFICE VISIT (OUTPATIENT)
Dept: BARIATRICS/WEIGHT MGMT | Age: 43
End: 2023-03-07
Payer: COMMERCIAL

## 2023-03-07 VITALS
DIASTOLIC BLOOD PRESSURE: 82 MMHG | OXYGEN SATURATION: 97 % | HEIGHT: 67 IN | RESPIRATION RATE: 18 BRPM | HEART RATE: 97 BPM | SYSTOLIC BLOOD PRESSURE: 130 MMHG | WEIGHT: 248 LBS | BODY MASS INDEX: 38.92 KG/M2

## 2023-03-07 DIAGNOSIS — M54.50 CHRONIC MIDLINE LOW BACK PAIN WITHOUT SCIATICA: ICD-10-CM

## 2023-03-07 DIAGNOSIS — E66.01 SEVERE OBESITY (BMI 35.0-39.9) WITH COMORBIDITY (HCC): Primary | ICD-10-CM

## 2023-03-07 DIAGNOSIS — I85.10 SECONDARY ESOPHAGEAL VARICES WITHOUT BLEEDING (HCC): ICD-10-CM

## 2023-03-07 DIAGNOSIS — G89.29 CHRONIC MIDLINE LOW BACK PAIN WITHOUT SCIATICA: ICD-10-CM

## 2023-03-07 DIAGNOSIS — I81 CAVERNOUS TRANSFORMATION OF PORTAL VEIN: ICD-10-CM

## 2023-03-07 DIAGNOSIS — K21.9 CHRONIC GERD: ICD-10-CM

## 2023-03-07 PROCEDURE — G8417 CALC BMI ABV UP PARAM F/U: HCPCS | Performed by: SURGERY

## 2023-03-07 PROCEDURE — G8484 FLU IMMUNIZE NO ADMIN: HCPCS | Performed by: SURGERY

## 2023-03-07 PROCEDURE — 99214 OFFICE O/P EST MOD 30 MIN: CPT | Performed by: SURGERY

## 2023-03-07 PROCEDURE — G8427 DOCREV CUR MEDS BY ELIG CLIN: HCPCS | Performed by: SURGERY

## 2023-03-07 PROCEDURE — 1036F TOBACCO NON-USER: CPT | Performed by: SURGERY

## 2023-03-07 NOTE — PATIENT INSTRUCTIONS
Initial Anesthesia Post-op Note    Patient: Sushila Linda  Procedure(s) Performed: DEBRIDEMENT, ULCER, LOWER EXTREMITY, PLACEMENT OF KERECIS GRAFT - BILATERAL  Anesthesia type: General    Vitals Value Taken Time   Temp 36.6 10/21/21 1512   Pulse 125 10/21/21 1512   Resp 17 10/21/21 1512   SpO2 100 % 10/21/21 1512   BP 96/68 10/21/21 1510   Vitals shown include unvalidated device data.      Patient Location: PACU Phase 1  Post-op Vital Signs:stable  Level of Consciousness: awake, responds to stimulation, follows commands, participates in exam, alert and oriented  Respiratory Status: spontaneous ventilation and nasal cannula  Cardiovascular blood pressure returned to baseline  Hydration: euvolemic  Pain Management: well controlled  Handoff: Handoff to receiving clinician was performed and questions were answered  Vomiting: none  Nausea: None  Airway Patency:patent  Post-op Assessment: awake, alert, appropriately conversant, or baseline, no complications, patient tolerated procedure well with no complications, no evidence of recall, dentition within defined limits, moving all extremities and No Corneal Abrasion      No complications documented.   Patient received dietary handouts and education.

## 2023-03-07 NOTE — PROGRESS NOTES
[address] [facility] lost 10 lbs over past 6 weeks. Pt reports has been working on her GI testing. Is pt eating at least 4 times everyday? Eating 3 x day - reports she is not a big snacker     Is pt eating a lean protein source with all meals and snacks? Yes   B - 2 HB eggs  L - Tuna with light lopez (no crackers)   D - salad with salmon     Has pt decreased their portions using the plate method? Doing okay with this     Is pt choosing low fat/sugar free options? Yes     Is pt drinking at least 64 oz of clear liquids everyday? Yes     Has pt stopped drinking carbonation, caffeinated, and sugar sweetened beverages? No - Drinks water, zero minute maid, regular gatorade, gatorade zero     Has pt sampled Unjury and/or Nectar protein? Not yet, discussed today     Has pt attended a support group? Not yet, discussed today     Participating in intentional exercise?  Walking the dog a lot + going to track/gym 2-3 x week     Plan/Recommendations:   Eat 4 x day   Eliminate regular gatorade   Attend SG   Try protein powder     Handouts: none     Hebert Ramos RD, LD

## 2023-03-07 NOTE — PROGRESS NOTES
[facility]) Physicians   [employer]  Maria L Winters MD, [facility], [address], [address] [facility] [address] . Phone: [phone]  Fax: [phone]          Chief Complaint   Patient presents with    Obesity     6th pre-surg         HPI:     Maegan Coon is a very pleasant 40 y.o. female with Body mass index is 38.84 kg/m². / Chronic Obesity. Alf Harden has been struggling for several years now with obesity. Ashley feels the weight is an obstacle to achieve and perform things in daily living as well risk on health. Patient  is very determined to lose weight and be healthy, and is working towards  surgical weight loss to achieve this goal. Pre-operative clearance and work up pending. Working hard to keep good dietary habits as well level of activity. Patient denies any nausea, vomiting, fevers, chills, shortness of breath, chest pain, cough, constipation or difficulty urinating. Pain Assessment   Denies any abdominal pain       Past Medical History:   Diagnosis Date    H/O blood clots     lung     Past Surgical History:   Procedure Laterality Date    ABDOMINOPLASTY      BREAST ENHANCEMENT SURGERY      HERNIA REPAIR      abd    UPPER GASTROINTESTINAL ENDOSCOPY N/A 2022    EGD BIOPSY performed by Dayna Durán MD at [address] [place] [address]     History reviewed. No pertinent family history. Social History     Tobacco Use    Smoking status: Former     Types: Cigarettes     Quit date: 2022     Years since quittin.4    Smokeless tobacco: Never   Substance Use Topics    Alcohol use: Yes     Comment: occasional     I counseled the patient on the importance of not smoking and risks of ETOH. Allergies   Allergen Reactions    Cat Hair Extract      cats    Dust Mite Extract     Seasonal      Vitals:    23 1206   BP: 130/82   Pulse: 97   Resp: 18   SpO2: 97%   Weight: 248 lb (112.5 kg)   Height: 5' 7\" (1.702 m)       Body mass index is 38.84 kg/m².     Lab Results   Component Value Date/Time    WBC 5.2 02/17/2023 05:05 PM    RBC 3.99 02/17/2023 05:05 PM    HGB 11.7 02/17/2023 05:05 PM    HCT 36.3 02/17/2023 05:05 PM    MCV 90.9 02/17/2023 05:05 PM    MCH 29.4 02/17/2023 05:05 PM    MCHC 32.3 02/17/2023 05:05 PM    MPV 8.2 02/17/2023 05:05 PM    NEUTOPHILPCT 56.9 02/17/2023 05:05 PM    LYMPHOPCT 30.1 02/17/2023 05:05 PM    MONOPCT 7.7 02/17/2023 05:05 PM    EOSRELPCT 4.4 02/17/2023 05:05 PM    BASOPCT 0.9 02/17/2023 05:05 PM    NEUTROABS 2.9 02/17/2023 05:05 PM    LYMPHSABS 1.6 02/17/2023 05:05 PM    MONOSABS 0.4 02/17/2023 05:05 PM    EOSABS 0.2 02/17/2023 05:05 PM     Lab Results   Component Value Date/Time     02/17/2023 05:05 PM    K 3.8 02/17/2023 05:05 PM    K 3.8 12/14/2020 06:00 AM     02/17/2023 05:05 PM    CO2 21 02/17/2023 05:05 PM    ANIONGAP 11 02/17/2023 05:05 PM    GLUCOSE 82 02/17/2023 05:05 PM    BUN 10 02/17/2023 05:05 PM    CREATININE 0.8 02/17/2023 05:05 PM    LABGLOM >60 02/17/2023 05:05 PM    GFRAA >60 09/09/2022 10:29 AM    CALCIUM 9.0 02/17/2023 05:05 PM    PROT 7.6 02/17/2023 05:05 PM    LABALBU 3.5 02/17/2023 05:05 PM    AGRATIO 0.9 02/17/2023 05:05 PM    BILITOT 0.3 02/17/2023 05:05 PM    ALKPHOS 91 02/17/2023 05:05 PM    ALT 13 02/17/2023 05:05 PM    AST 17 02/17/2023 05:05 PM    GLOB 3.9 12/13/2020 09:59 AM     Lab Results   Component Value Date/Time    CHOL 165 09/09/2022 10:29 AM    TRIG 47 09/09/2022 10:29 AM    HDL 62 09/09/2022 10:29 AM    LDLCALC 94 09/09/2022 10:29 AM    LABVLDL 9 09/09/2022 10:29 AM     Lab Results   Component Value Date/Time    TSHREFLEX 2.19 09/09/2022 10:29 AM     Lab Results   Component Value Date/Time    IRON 55 02/17/2023 05:05 PM    TIBC 313 02/17/2023 05:05 PM    LABIRON 18 02/17/2023 05:05 PM     Lab Results   Component Value Date/Time    JGUUEBVR76 408 09/09/2022 10:29 AM    FOLATE 4.37 09/09/2022 10:29 AM     Lab Results   Component Value Date/Time    VITD25 12.3 09/09/2022 10:29 AM     Lab Results   Component Value Date/Time    LABA1C 5.1 09/09/2022 10:29 AM    EAG 99.7 09/09/2022 10:29 AM         Current Outpatient Medications:     Cholecalciferol 50 MCG (2000 UT) TABS, Take 1 tablet by mouth daily Take 1 tablet by mouth daily. Start this medication after you finish the weekly regimen, Disp: 90 tablet, Rfl: 2    omeprazole (PRILOSEC) 20 MG delayed release capsule, Take 1 capsule by mouth every morning (before breakfast), Disp: 90 capsule, Rfl: 1    Review of Systems - History obtained from the patient  General ROS: negative  Psychological ROS: negative  Ophthalmic ROS: negative  Neurological ROS: negative  ENT ROS: negative  Allergy and Immunology ROS: negative  Hematological and Lymphatic ROS: negative  Endocrine ROS: negative  Breast ROS: negative  Respiratory ROS: negative  Cardiovascular ROS: negative  Gastrointestinal ROS:negative  Genito-Urinary ROS: negative  Musculoskeletal ROS: negative   Skin ROS: negative    Physical Exam   Vitals Reviewed   Constitutional: Patient is oriented to person, place, and time. Patient appears well-developed and well-nourished. Patient is active and cooperative. Non-toxic appearance. No distress. HENT:   Head: Normocephalic and atraumatic. Head is without abrasion and without laceration. Hair is normal.   Right Ear: External ear normal. No lacerations. No drainage, swelling . Left Ear: External ear normal. No lacerations. No drainage, swelling. Nose/Mouth: face mask in place  Eyes: Conjunctivae, EOM and lids are normal. Right eye exhibits no discharge. No foreign body present in the right eye. Left eye exhibits no discharge. No foreign body present in the left eye. No scleral icterus. Neck: Trachea normal and normal range of motion. No JVD present. Pulmonary/Chest: Effort normal. No accessory muscle usage or stridor. No apnea. No respiratory distress. Cardiovascular: Normal rate and no JVD. Abdominal: Normal appearance.  Patient exhibits no distension. Abdomen is soft, obese, non tender. Musculoskeletal: Normal range of motion. Patient exhibits no edema. Neurological: Patient is alert and oriented to person, place, and time. Patient has normal strength. GCS eye subscore is 4. GCS verbal subscore is 5. GCS motor subscore is 6. Skin: Skin is warm and dry. No abrasion and no rash noted. Patient is not diaphoretic. No cyanosis or erythema. Psychiatric: Patient has a normal mood and affect. Speech is normal and behavior is normal. Cognition and memory are normal.       A/P    Zack Mcintosh is 40 y.o. female, Body mass index is 38.84 kg/m². pre surgery, has lost 10 lbs since last visit. The patient underwent extensive dietary counseling with registered dietician. I have reviewed, discussed and agree with the dietary plan. Patient is trying hard to keep good dietary and behavior modifications. Patient is monitoring portion sizes, food choices and liquid calories. Patient is trying to exercise regularly as much as possible. Obesity as a disease is considered a high risk to patients overall health and should therefore be considered a high risk disease state. Advised the patient that not getting there weight under control, that could increase risk of complications/worsening of those conditions on the long-term. (Goal of weight loss surgery is to alleviate/control some of those co-morbidities)    Now with Covid-19 pandemic, CDC and health authorities does classify obese patients as vulnerable and high risk as well. Which makes weight loss a priority for improvement of their wellbeing and overall health. I encouraged the patient to continue exercise and keeping healthy eating habits. Discussed pre-op labs and work up till now. Also counseled the patient extensively on Surgery.      I did explain thoroughly to the patient that compliance with pre- and post op diet and other recommendations are integral part to improve the chances of successful weight loss and also not following it could end with serious health complications. Some strategies discussed today include but not limited to : 30/30/30 minutes rule, food diary, avoid fast food and packing/planing ahead, & increasing exercise. Also stressed to the patient importance of taking the multivitamins as instructed, otherwise risk significant complications. The visit today, included any number of the following: Bariatric Preoperative work up/protocols, review of labs, imaging, provider notes, outside hospital records, performing examination/evaluation, counseling patient and/or family, ordering medications/tests, placing referrals and communication with referring physicians, coordination of care; discussing dietary plan/recall with the patient as well with registered dietitian and documentation in the EHR. Of note, the above was done during same day of the actual patient encounter. Tish Sanchez is here for her 6 presurgical visit. 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 Given this finding as well as the endoscopic findings I do not feel it safe to proceed with bariatric surgery. Patient has history of DVT and PE so there is a questionable hypercoagulable status and now with the portal vein abnormality I would be very worried for increased risk of portal vein thrombosis postop. Medical weight loss is the only option we can offer the patient here. Advised the patient that also she needs to follow with hematology to ensure she does not have any underlying hypercoagulable disease. Also encouraged the patient to continue follow-up with GI. Recommend the patient join the medical weight loss program to continue weight loss towards their goal. Patient understands that there is no further surgical options at this point that we can offer.        We discussed how her excess weight affects her overall health and importance of weight loss, healthy diet and active lifestyle to alleviate those co morbid conditions, otherwise risk deterioration. Severe Obesity: Body mass index is 38.84 kg/m². [x] Continue to make dietary and lifestyle modifications. [x] Plan for [facility]. [x] Return for follow-up next month. Chronic GERD:   [x] Continue to make dietary and lifestyle modifications. [x] Continue Omeprazole. [] Continue Famotidine. [] Plan for EGD to evaluate the stomach. Patient advised that its their responsibility to follow up for studies, referrals and/or labs ordered today.

## 2023-03-07 NOTE — Clinical Note
Presurgical patient however I found some varices on her endoscopy and then she saw GI and they ordered an ultrasound which ruled cavernous portal vein. Definitely increases her risk for portal vein thrombosis on top of that patient did have history of DVT and PE in the past so most likely have some hypercoagulable condition.

## 2023-04-11 ENCOUNTER — TELEPHONE (OUTPATIENT)
Dept: FAMILY MEDICINE CLINIC | Age: 43
End: 2023-04-11

## 2023-04-11 DIAGNOSIS — Z30.9 ENCOUNTER FOR CONTRACEPTIVE MANAGEMENT, UNSPECIFIED TYPE: Primary | ICD-10-CM
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
## 2023-04-18 ENCOUNTER — HOSPITAL ENCOUNTER (OUTPATIENT)
Age: 43
Discharge: HOME | End: 2023-04-18
Payer: COMMERCIAL

## 2023-04-18 DIAGNOSIS — E66.9 CLASS 2 OBESITY: ICD-10-CM

## 2023-04-18 LAB
25(OH)D3 SERPL-MCNC: 16.2 NG/ML
ALBUMIN SERPL-MCNC: 3.8 G/DL (ref 3.4–5)
ALBUMIN/GLOB SERPL: 1 {RATIO} (ref 1.1–2.2)
ALP SERPL-CCNC: 106 U/L (ref 40–129)
ALT SERPL-CCNC: 10 U/L (ref 10–40)
ANION GAP SERPL CALCULATED.3IONS-SCNC: 11 MMOL/L (ref 3–16)
AST SERPL-CCNC: 17 U/L (ref 15–37)
BILIRUB SERPL-MCNC: 0.6 MG/DL (ref 0–1)
BUN SERPL-MCNC: 8 MG/DL (ref 7–20)
CALCIUM SERPL-MCNC: 8.9 MG/DL (ref 8.3–10.6)
CHLORIDE SERPL-SCNC: 108 MMOL/L (ref 99–110)
CO2 SERPL-SCNC: 22 MMOL/L (ref 21–32)
CREAT SERPL-MCNC: 0.8 MG/DL (ref 0.6–1.1)
FOLATE SERPL-MCNC: 7.14 NG/ML (ref 4.78–24.2)
GFR SERPLBLD CREATININE-BSD FMLA CKD-EPI: >60 ML/MIN/{1.73_M2}
GLUCOSE SERPL-MCNC: 78 MG/DL (ref 70–99)
POTASSIUM SERPL-SCNC: 4 MMOL/L (ref 3.5–5.1)
PROT SERPL-MCNC: 7.7 G/DL (ref 6.4–8.2)
SODIUM SERPL-SCNC: 141 MMOL/L (ref 136–145)
TSH SERPL DL<=0.005 MIU/L-ACNC: 2.53 UIU/ML (ref 0.27–4.2)
VIT B12 SERPL-MCNC: 526 PG/ML (ref 211–911)

## 2023-04-18 PROCEDURE — 82306 VITAMIN D 25 HYDROXY: CPT

## 2023-04-18 PROCEDURE — 83036 HEMOGLOBIN GLYCOSYLATED A1C: CPT

## 2023-04-18 PROCEDURE — 82607 VITAMIN B-12: CPT

## 2023-04-18 PROCEDURE — 36415 COLL VENOUS BLD VENIPUNCTURE: CPT

## 2023-04-18 PROCEDURE — 84443 ASSAY THYROID STIM HORMONE: CPT

## 2023-04-18 PROCEDURE — 80053 COMPREHEN METABOLIC PANEL: CPT

## 2023-04-18 PROCEDURE — 82746 ASSAY OF FOLIC ACID SERUM: CPT

## 2023-04-19 ENCOUNTER — HOSPITAL ENCOUNTER (OUTPATIENT)
Dept: PHYSICAL THERAPY | Age: 43
Setting detail: THERAPIES SERIES
Discharge: HOME | End: 2023-04-19
Payer: COMMERCIAL

## 2023-04-19 LAB
EST. AVERAGE GLUCOSE BLD GHB EST-MCNC: 102.5 MG/DL
HBA1C MFR BLD: 5.2 %

## 2023-04-19 PROCEDURE — 97140 MANUAL THERAPY 1/> REGIONS: CPT

## 2023-04-19 PROCEDURE — 97110 THERAPEUTIC EXERCISES: CPT

## 2023-04-19 PROCEDURE — 97161 PT EVAL LOW COMPLEX 20 MIN: CPT

## 2023-04-19 NOTE — FLOWSHEET NOTE
[]Transfers []Reaching  []Housework []Lifting  []Driving []Job related tasks  []Sports/Recreation  []Sleeping  []Other:    ASSESSMENT:  See eval  Treatment/Activity Tolerance:  [] Patient able to complete tx  [] Patient limited by fatique  [] Patient limited by pain   [] Patient limited by other medical complications  [] Other:     Prognosis: [] Good [] Fair  [] Poor    Patient Requires Follow-up: [x] Yes  [] No    PLAN: See eval. PT 2x / week for 6 weeks. Began with postural exercises, cervical  and left shoulder ROM, and manual therapy as tolerated. [] Continue per plan of care [] Alter current plan (see comments)  [x] Plan of care initiated [] Hold pending MD visit [] Discharge    Electronically signed by: Rosey Erickson PT , DPT  [id number]       Note: If patient does not return for scheduled/ recommended follow up visits, this note will serve as a discharge from care along with most recent update on progress.

## 2023-04-19 NOTE — PLAN OF CARE
Met: [] Adjusted  3. Patient will demonstrate an increase in postural awareness and control and activation of  Deep cervical stabilizers to allow for proper functional mobility as indicated by patients Functional Deficits. [] Progressing: [] Met: [] Not Met: [] Adjusted  4. Patient will return to functional activities including lifting and driving without increased symptoms or restriction. [] Progressing: [] Met: [] Not Met: [] Adjusted  5.   Patient to  tolerate sleeping through the night without interruption at least 6-7 hours   [] Progressing: [] Met: [] Not Met: [] Adjusted     Electronically signed by:  Abhijit Torres, PT

## 2023-04-22 ENCOUNTER — TELEMEDICINE (OUTPATIENT)
Dept: BARIATRICS/WEIGHT MGMT | Age: 43
End: 2023-04-22
Payer: COMMERCIAL

## 2023-04-22 DIAGNOSIS — E66.9 CLASS 2 OBESITY: Primary | ICD-10-CM

## 2023-04-22 DIAGNOSIS — Z71.3 DIETARY COUNSELING AND SURVEILLANCE: ICD-10-CM

## 2023-04-22 DIAGNOSIS — E55.9 VITAMIN D DEFICIENCY: ICD-10-CM

## 2023-04-22 PROCEDURE — G8427 DOCREV CUR MEDS BY ELIG CLIN: HCPCS | Performed by: FAMILY MEDICINE

## 2023-04-22 PROCEDURE — 99214 OFFICE O/P EST MOD 30 MIN: CPT | Performed by: FAMILY MEDICINE

## 2023-04-22 RX ORDER — NALTREXONE HYDROCHLORIDE AND BUPROPION HYDROCHLORIDE 8; 90 MG/1; MG/1
TABLET, EXTENDED RELEASE ORAL
Qty: 120 TABLET | Refills: 0 | Status: SHIPPED | OUTPATIENT
Start: 2023-04-22

## 2023-04-22 ASSESSMENT — ENCOUNTER SYMPTOMS
BLOOD IN STOOL: 0
EYE PAIN: 0
CONSTIPATION: 0
PHOTOPHOBIA: 0
APNEA: 0
COUGH: 0
VOMITING: 0
WHEEZING: 0
SHORTNESS OF BREATH: 0
CHEST TIGHTNESS: 0
ABDOMINAL PAIN: 0
ABDOMINAL DISTENTION: 0
NAUSEA: 0
DIARRHEA: 0
CHOKING: 0

## 2023-04-22 NOTE — PROGRESS NOTES
Vitals/Constitutional/EENT/Resp/CV/GI//MS/Neuro/Skin/Heme-Lymph-Imm. Vinicio Gaspar, was evaluated through a synchronous (real-time) audio-video encounter. The patient (or guardian if applicable) is aware that this is a billable service, which includes applicable co-pays. This Virtual Visit was conducted with patient's (and/or legal guardian's) consent. The visit was conducted pursuant to the emergency declaration under the [address] [place] [address], [address] [place] [address] authority and the [employer] and [employer] General Act. Patient identification was verified, and a caregiver was present when appropriate. The patient was located at [place]  Provider was located at Home ([facility]): [place]         Total time spent for this encounter: Not billed by time    --Kandi Brandon MD on 4/22/2023 at 5:00 PM    An electronic signature was used to authenticate this note.

## 2023-04-28 ENCOUNTER — HOSPITAL ENCOUNTER (OUTPATIENT)
Dept: PHYSICAL THERAPY | Age: 43
Setting detail: THERAPIES SERIES
Discharge: HOME | End: 2023-04-28
Payer: COMMERCIAL

## 2023-04-28 PROCEDURE — 97110 THERAPEUTIC EXERCISES: CPT

## 2023-04-28 PROCEDURE — 97140 MANUAL THERAPY 1/> REGIONS: CPT

## 2023-04-28 NOTE — FLOWSHEET NOTE
and requires additional follow up with physician  [] Other    Persisting Functional Limitations/Impairments:  []Sitting []Standing   []Walking []Squatting/bending    []Stairs []ADL's    []Transfers []Reaching  []Housework []Lifting  []Driving []Job related tasks  []Sports/Recreation  []Sleeping  []Other:    ASSESSMENT:  The patient began ROM and periscapular strengthening with minimal discomfort. She presented with neuromuscular tension to Left shoulder and hand with radicular symptoms. She noticeable  muscle cervicothoracic muscle guarding and restriction, requiring cues to relax. Performed manual ulnar nerve glides, cervical passive stretching, 1st/2nd ribs mobs R,and PA's mobs to the thoracic spine.   Patient respond favorably to manual intervention and MHP at the of the session. Ongoing PT is necessary to further reduce tissue tension and cervicothoracic restriction as tolerated.      Treatment/Activity Tolerance:  [] Patient able to complete tx  [] Patient limited by fatique  [] Patient limited by pain   [] Patient limited by other medical complications  [] Other:     Prognosis: [] Good [] Fair  [] Poor    Patient Requires Follow-up: [x] Yes  [] No    PLAN: See eval. PT 2x / week for 6 weeks.    Began with postural exercises, cervical  and left shoulder ROM, and manual therapy as tolerated.  [x] Continue per plan of care [] Alter current plan (see comments)  [] Plan of care initiated [] Hold pending MD visit [] Discharge    Electronically signed by: Clyde Hatfield PT , DPT  [id number]       Note: If patient does not return for scheduled/ recommended follow up visits, this note will serve as a discharge from care along with most recent update on progress.

## 2023-05-10 ENCOUNTER — HOSPITAL ENCOUNTER (OUTPATIENT)
Dept: PHYSICAL THERAPY | Age: 43
Setting detail: THERAPIES SERIES
Discharge: HOME | End: 2023-05-10
Payer: COMMERCIAL

## 2023-05-10 NOTE — PROGRESS NOTES
[address]     Physical Therapy  Cancellation/No-show Note  Patient Name:  Marcial Hooper  :  1980   Date:  5/10/2023  Cancelled visits to date: 0  No-shows to date: 1    Patient status for today's appointment patient:  []  Cancelled  []  Rescheduled appointment  [x]  No-show 5/10     Reason given by patient:  []  Patient ill  []  Conflicting appointment  []  No transportation    []  Conflict with work  [x]  No reason given  []  Other:     Comments:      Phone call information:   []  Phone call made today to patient at _ time at number provided:      []  Patient answered, conversation as follows:    []  Patient did not answer, message left as follows:  [x]  Phone call not made today  []  Phone call not needed - pt contacted us to cancel and provided reason for cancellation.      Electronically signed by:  Montserrat Dunne PT

## 2023-05-12 ENCOUNTER — HOSPITAL ENCOUNTER (OUTPATIENT)
Dept: PHYSICAL THERAPY | Age: 43
Setting detail: THERAPIES SERIES
Discharge: HOME | End: 2023-05-12
Payer: COMMERCIAL

## 2023-05-12 PROCEDURE — 97110 THERAPEUTIC EXERCISES: CPT

## 2023-05-12 PROCEDURE — 97140 MANUAL THERAPY 1/> REGIONS: CPT
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
## 2023-05-22 ENCOUNTER — HOSPITAL ENCOUNTER (OUTPATIENT)
Dept: PHYSICAL THERAPY | Age: 43
Setting detail: THERAPIES SERIES
Discharge: HOME | End: 2023-05-22
Payer: COMMERCIAL

## 2023-05-22 NOTE — PROGRESS NOTES
[address] [place] [address] [facility]     Physical Therapy  Cancellation/No-show Note  Patient Name:  Oriana Malone  :  1980   Date:  2023  Cancelled visits to date: 0  No-shows to date: 2    Patient status for today's appointment patient:  []  Cancelled  []  Rescheduled appointment  [x]  No-show 5/10,      Reason given by patient:  []  Patient ill  []  Conflicting appointment  []  No transportation    []  Conflict with work  [x]  No reason given  []  Other:     Comments:  Attempted a call      Phone call information:   [x]  Phone call made today to patient at _ time at number provided:      []  Patient answered, conversation as follows:    []  Patient did not answer, message left as follows:  [x]  Phone call not made today  []  Phone call not needed - pt contacted us to cancel and provided reason for cancellation.      Electronically signed by:  Morrell Lesches, PT

## 2023-05-24 ENCOUNTER — HOSPITAL ENCOUNTER (OUTPATIENT)
Dept: PHYSICAL THERAPY | Age: 43
Setting detail: THERAPIES SERIES
Discharge: HOME | End: 2023-05-24
Payer: COMMERCIAL

## 2023-05-24 NOTE — PROGRESS NOTES
[address]     Physical Therapy  Cancellation/No-show Note  Patient Name:  Bhumika Schofield  :  1980   Date:  2023  Cancelled visits to date: 0  No-shows to date: 3    Patient status for today's appointment patient:  []  Cancelled  []  Rescheduled appointment  [x]  No-show 5/10, ,      Reason given by patient:  []  Patient ill  []  Conflicting appointment  []  No transportation    []  Conflict with work  [x]  No reason given  []  Other:     Comments:  Attempted a call , Attempted   . Will cancel remaining appointments. Phone call information:   [x]  Phone call made today to patient at _ time at number provided:      []  Patient answered, conversation as follows:    []  Patient did not answer, message left as follows:  [x]  Phone call not made today  []  Phone call not needed - pt contacted us to cancel and provided reason for cancellation.      Electronically signed by:  Ly Garcia PT

## 2023-05-31 ENCOUNTER — APPOINTMENT (OUTPATIENT)
Dept: PHYSICAL THERAPY | Age: 43
End: 2023-05-31
Payer: COMMERCIAL

## 2023-06-02 ENCOUNTER — APPOINTMENT (OUTPATIENT)
Dept: PHYSICAL THERAPY | Age: 43
End: 2023-06-02
Payer: COMMERCIAL

## 2023-06-22 ENCOUNTER — TELEMEDICINE (OUTPATIENT)
Dept: BARIATRICS/WEIGHT MGMT | Age: 43
End: 2023-06-22
Payer: COMMERCIAL

## 2023-06-22 DIAGNOSIS — Z71.3 DIETARY COUNSELING AND SURVEILLANCE: ICD-10-CM

## 2023-06-22 DIAGNOSIS — E66.9 CLASS 2 OBESITY: Primary | ICD-10-CM

## 2023-06-22 PROCEDURE — G8427 DOCREV CUR MEDS BY ELIG CLIN: HCPCS | Performed by: FAMILY MEDICINE

## 2023-06-22 PROCEDURE — 99214 OFFICE O/P EST MOD 30 MIN: CPT | Performed by: FAMILY MEDICINE

## 2023-06-22 ASSESSMENT — ENCOUNTER SYMPTOMS
BLOOD IN STOOL: 0
DIARRHEA: 0
CHEST TIGHTNESS: 0
CHOKING: 0
VOMITING: 0
CONSTIPATION: 0
COUGH: 0
APNEA: 0
EYE PAIN: 0
NAUSEA: 0
PHOTOPHOBIA: 0
ABDOMINAL PAIN: 0
ABDOMINAL DISTENTION: 0
WHEEZING: 0
SHORTNESS OF BREATH: 0

## 2023-06-22 NOTE — PROGRESS NOTES
Patient: Carolien Harper                      Encounter Date: 6/22/2023    YOB: 1980                Age: 40 y.o. Chief Complaint   Patient presents with    Weight Management     F/u JOSE MARIA         Patient identification was verified at the start of the visit. Patient-Reported Vitals 6/22/2023   Patient-Reported Weight 248   Patient-Reported Height 5 7   Patient-Reported Systolic -   Patient-Reported Diastolic -   Patient-Reported Pulse -   Patient-Reported Temperature -         BP Readings from Last 1 Encounters:   03/07/23 130/82       BMI Readings from Last 1 Encounters:   03/07/23 38.84 kg/m²       Pulse Readings from Last 1 Encounters:   03/07/23 97          Wt Readings from Last 3 Encounters:   03/07/23 248 lb (112.5 kg)   01/17/23 258 lb (117 kg)   12/09/22 251 lb 1.6 oz (113.9 kg)        HPI: 40 y.o. female with a long-standing history of obesity presents today for virtual video follow-up. She has lost 6 pounds since her last visit. She was on Contrave for 3 weeks before stopping tx for week while on vacation. She just restarted tx again last week. No side effects. Currently on 3 pills/day. Would like to continue tx. Medication(s): Appetite well controlled? [x]Yes      []No    Focus:     []Good     [x]Fair     []Poor    Side effects? No        Any recent change in medication(s)? No       Allergies   Allergen Reactions    Cat Hair Extract      cats    Dust Mite Extract     Seasonal          Current Outpatient Medications:     naltrexone-buPROPion (CONTRAVE) 8-90 MG per extended release tablet, Start:1 tab po qam, then 1 tab po bid x 1 wk, then 2 tabs po qam and 1 tab po qpm x 1 wk; Max 4 tabs/day, Disp: 120 tablet, Rfl: 0    methylPREDNISolone (MEDROL DOSEPACK) 4 MG tablet, , Disp: , Rfl:     tiZANidine (ZANAFLEX) 4 MG tablet, tizanidine 4 mg tablet, Disp: , Rfl:     Cholecalciferol 50 MCG (2000 UT) TABS, Take 1 tablet by mouth daily Take 1 tablet by mouth daily.  Start this

## 2023-06-30 ENCOUNTER — HOSPITAL ENCOUNTER (OUTPATIENT)
Dept: PHYSICAL THERAPY | Age: 43
Setting detail: THERAPIES SERIES
Discharge: HOME | End: 2023-06-30
Payer: COMMERCIAL

## 2023-06-30 DIAGNOSIS — E66.01 SEVERE OBESITY (BMI 35.0-39.9) WITH COMORBIDITY (HCC): ICD-10-CM

## 2023-06-30 DIAGNOSIS — Z71.3 DIETARY COUNSELING AND SURVEILLANCE: Primary | ICD-10-CM

## 2023-06-30 PROCEDURE — 97110 THERAPEUTIC EXERCISES: CPT

## 2023-06-30 PROCEDURE — 97530 THERAPEUTIC ACTIVITIES: CPT

## 2023-06-30 PROCEDURE — 97140 MANUAL THERAPY 1/> REGIONS: CPT

## 2023-07-03 ENCOUNTER — HOSPITAL ENCOUNTER (OUTPATIENT)
Dept: PHYSICAL THERAPY | Age: 43
Setting detail: THERAPIES SERIES
Discharge: HOME | End: 2023-07-03
Payer: COMMERCIAL

## 2023-07-03 NOTE — PROGRESS NOTES
[facility]     Physical Therapy  Cancellation/No-show Note  Patient Name:  Rina Sevilla  :  1980   Date:  7/3/2023  Cancelled visits to date: 0  No-shows to date: 4    Patient status for today's appointment patient:  []  Cancelled  []  Rescheduled appointment  [x]  No-show 5/10, , , 7/3     Reason given by patient:  []  Patient ill  []  Conflicting appointment  []  No transportation    []  Conflict with work  [x]  No reason given  []  Other:     Comments:  Attempted a call , Attempted   . Will cancel remaining appointments. Phone call information:   [x]  Phone call made today to patient at _ time at number provided:      []  Patient answered, conversation as follows:    []  Patient did not answer, message left as follows:  [x]  Phone call not made today  []  Phone call not needed - pt contacted us to cancel and provided reason for cancellation.      Electronically signed by:  Trung De Luna PT

## 2023-07-05 ENCOUNTER — HOSPITAL ENCOUNTER (OUTPATIENT)
Dept: PHYSICAL THERAPY | Age: 43
Setting detail: THERAPIES SERIES
Discharge: HOME | End: 2023-07-05
Payer: COMMERCIAL

## 2023-07-05 PROCEDURE — 97112 NEUROMUSCULAR REEDUCATION: CPT

## 2023-07-05 PROCEDURE — 97110 THERAPEUTIC EXERCISES: CPT

## 2023-07-05 PROCEDURE — 97140 MANUAL THERAPY 1/> REGIONS: CPT
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
## 2023-07-10 ENCOUNTER — HOSPITAL ENCOUNTER (OUTPATIENT)
Dept: PHYSICAL THERAPY | Age: 43
Setting detail: THERAPIES SERIES
Discharge: HOME | End: 2023-07-10
Payer: COMMERCIAL

## 2023-07-11 NOTE — PROGRESS NOTES
[address] [facility]     Physical Therapy  Cancellation/No-show Note  Patient Name:  Veronica Alvarez  :  1980   Date:  7/10/2023  Cancelled visits to date: 0  No-shows to date: 5    Patient status for today's appointment patient:  []  Cancelled  []  Rescheduled appointment  [x]  No-show 5/10, , , 7/3, 7/10     Reason given by patient:  []  Patient ill  []  Conflicting appointment  []  No transportation    []  Conflict with work  [x]  No reason given  []  Other:     Comments:      Phone call information:   []  Phone call made today to patient at _ time at number provided:      []  Patient answered, conversation as follows:    []  Patient did not answer, message left as follows:  [x]  Phone call not made today  []  Phone call not needed - pt contacted us to cancel and provided reason for cancellation. Electronically signed by:   Janice Iqbal PT

## 2023-07-12 ENCOUNTER — HOSPITAL ENCOUNTER (OUTPATIENT)
Dept: PHYSICAL THERAPY | Age: 43
Setting detail: THERAPIES SERIES
Discharge: HOME | End: 2023-07-12
Payer: COMMERCIAL

## 2023-07-12 PROCEDURE — 97140 MANUAL THERAPY 1/> REGIONS: CPT

## 2023-07-12 PROCEDURE — 97112 NEUROMUSCULAR REEDUCATION: CPT

## 2023-07-12 PROCEDURE — 97110 THERAPEUTIC EXERCISES: CPT

## 2023-07-12 NOTE — FLOWSHEET NOTE
[address] [id number]      Note: If patient does not return for scheduled/ recommended follow up visits, this note will serve as a discharge from care along with most recent update on progress.

## 2023-07-19 ENCOUNTER — HOSPITAL ENCOUNTER (OUTPATIENT)
Dept: PHYSICAL THERAPY | Age: 43
Setting detail: THERAPIES SERIES
Discharge: HOME | End: 2023-07-19
Payer: COMMERCIAL

## 2023-07-19 PROCEDURE — 97112 NEUROMUSCULAR REEDUCATION: CPT

## 2023-07-19 PROCEDURE — 97110 THERAPEUTIC EXERCISES: CPT

## 2023-07-19 PROCEDURE — 97140 MANUAL THERAPY 1/> REGIONS: CPT

## 2023-07-19 NOTE — FLOWSHEET NOTE
daily - 7 x weekly - 1 sets - 10 reps - 3s hold  - Seated Cervical Rotation AROM  - 1 x daily - 7 x weekly - 1 sets - 10 reps  - Seated Cervical Flexion AROM  - 1 x daily - 7 x weekly - 1 sets - 10 reps  - Seated Cervical Sidebending AROM  - 1 x daily - 7 x weekly - 1 sets - 10 reps  - Seated Passive Cervical Retraction  - 1 x daily - 7 x weekly - 1 sets - 10 reps  - Seated Cervical Sidebending Stretch  - 2-3 x daily - 7 x weekly - 1 sets - 3 reps - 10 hold  - Seated Levator Scapulae Stretch  - 2-3 x daily - 7 x weekly - 1 sets - 3 reps - 10 hold  - Doorway Pec Stretch at 90 Degrees Abduction  - 2-3 x daily - 7 x weekly - 1 sets - 3 reps - 10 hold    Access Code: [email or web address]  URL: [email or web address]  Date: 04/19/2023  Prepared by: Rober Pedlar    Exercises  - Seated Scapular Retraction  - 1 x daily - 7 x weekly - 1 sets - 10 reps - 3s hold  - Seated Cervical Rotation AROM  - 1 x daily - 7 x weekly - 1 sets - 10 reps  - Seated Cervical Flexion AROM  - 1 x daily - 7 x weekly - 1 sets - 10 reps  - Seated Cervical Sidebending AROM  - 1 x daily - 7 x weekly - 1 sets - 10 reps  - Seated Passive Cervical Retraction  - 1 x daily - 7 x weekly - 1 sets - 10 reps    Therapeutic Exercise and NMR EXR  [x] (67657) Provided verbal/tactile cueing for activities related to strengthening, flexibility, endurance, ROM  for improvements in cervical, postural, scapular, scapulothoracic and UE control with self care, reaching, carrying, lifting, house/yardwork, driving/computer work.    [] (60174) Provided verbal/tactile cueing for activities related to improving balance, coordination, kinesthetic sense, posture, motor skill, proprioception  to assist with cervical, scapular, scapulothoracic and UE control with self care, reaching, carrying, lifting, house/yardwork, driving/computer work.  [] (62555) Therapist is in constant attendance of 2 or more patients providing skilled therapy interventions, but not providing any

## 2023-07-21 ENCOUNTER — HOSPITAL ENCOUNTER (OUTPATIENT)
Dept: PHYSICAL THERAPY | Age: 43
Setting detail: THERAPIES SERIES
Discharge: HOME | End: 2023-07-21
Payer: COMMERCIAL

## 2023-07-21 NOTE — PROGRESS NOTES
[address] [facility]     Physical Therapy  Cancellation/No-show Note  Patient Name:  Ashely Bailey  :  1980   Date:  2023  Cancelled visits to date: 0  No-shows to date: 5    Patient status for today's appointment patient:  []  Cancelled  []  Rescheduled appointment  [x]  No-show 5/10, , , 7/3, 7/10,      Reason given by patient:  []  Patient ill  []  Conflicting appointment  []  No transportation    []  Conflict with work  []  No reason given  [x]  Other: \"can't make it\"     Comments:      Phone call information:   []  Phone call made today to patient at _ time at number provided:      []  Patient answered, conversation as follows:    []  Patient did not answer, message left as follows:  []  Phone call not made today  [x]  Phone call not needed - pt contacted us to cancel and provided reason for cancellation.      Electronically signed by:  Lashaun Cruz, PT, DPT, OCS, OMT-C Mustarde Flap Text: The defect edges were debeveled with a #15 scalpel blade.  Given the size, depth and location of the defect and the proximity to free margins a Mustarde flap was deemed most appropriate.  Using a sterile surgical marker, an appropriate flap was drawn incorporating the defect. The area thus outlined was incised with a #15 scalpel blade.  The skin margins were undermined to an appropriate distance in all directions utilizing iris scissors.

## 2023-07-24 ENCOUNTER — APPOINTMENT (OUTPATIENT)
Dept: PHYSICAL THERAPY | Age: 43
End: 2023-07-24
Payer: COMMERCIAL

## 2023-07-28 VITALS — BODY MASS INDEX: 37.51 KG/M2 | WEIGHT: 239 LBS | HEIGHT: 67 IN

## 2023-07-29 ENCOUNTER — TELEMEDICINE (OUTPATIENT)
Dept: BARIATRICS/WEIGHT MGMT | Age: 43
End: 2023-07-29
Payer: COMMERCIAL

## 2023-07-29 DIAGNOSIS — Z71.3 DIETARY COUNSELING AND SURVEILLANCE: ICD-10-CM

## 2023-07-29 DIAGNOSIS — E66.9 CLASS 2 OBESITY: Primary | ICD-10-CM

## 2023-07-29 PROCEDURE — 99214 OFFICE O/P EST MOD 30 MIN: CPT | Performed by: FAMILY MEDICINE

## 2023-07-29 PROCEDURE — G8427 DOCREV CUR MEDS BY ELIG CLIN: HCPCS | Performed by: FAMILY MEDICINE

## 2023-07-29 RX ORDER — NALTREXONE HYDROCHLORIDE AND BUPROPION HYDROCHLORIDE 8; 90 MG/1; MG/1
2 TABLET, EXTENDED RELEASE ORAL 2 TIMES DAILY
Qty: 120 TABLET | Refills: 0 | Status: SHIPPED | OUTPATIENT
Start: 2023-07-29

## 2023-07-29 ASSESSMENT — ENCOUNTER SYMPTOMS
COUGH: 0
CHEST TIGHTNESS: 0
WHEEZING: 0
APNEA: 0
ABDOMINAL DISTENTION: 0
DIARRHEA: 0
ABDOMINAL PAIN: 0
EYE PAIN: 0
BLOOD IN STOOL: 0
NAUSEA: 0
SHORTNESS OF BREATH: 0
VOMITING: 0
PHOTOPHOBIA: 0
CHOKING: 0
CONSTIPATION: 0

## 2023-07-31 ENCOUNTER — HOSPITAL ENCOUNTER (EMERGENCY)
Age: 43
Discharge: HOME | End: 2023-07-31
Payer: COMMERCIAL

## 2023-07-31 ENCOUNTER — TELEPHONE (OUTPATIENT)
Dept: FAMILY MEDICINE CLINIC | Age: 43
End: 2023-07-31

## 2023-07-31 ENCOUNTER — APPOINTMENT (OUTPATIENT)
Dept: CT IMAGING | Age: 43
End: 2023-07-31
Payer: COMMERCIAL

## 2023-07-31 VITALS
DIASTOLIC BLOOD PRESSURE: 54 MMHG | OXYGEN SATURATION: 100 % | SYSTOLIC BLOOD PRESSURE: 169 MMHG | TEMPERATURE: 97.9 F | RESPIRATION RATE: 16 BRPM | HEART RATE: 92 BPM

## 2023-07-31 DIAGNOSIS — R06.00 DYSPNEA, UNSPECIFIED TYPE: Primary | ICD-10-CM

## 2023-07-31 LAB
ALBUMIN SERPL-MCNC: 3.8 G/DL (ref 3.4–5)
ALBUMIN/GLOB SERPL: 1 {RATIO} (ref 1.1–2.2)
ALP SERPL-CCNC: 112 U/L (ref 40–129)
ALT SERPL-CCNC: 13 U/L (ref 10–40)
ANION GAP SERPL CALCULATED.3IONS-SCNC: 9 MMOL/L (ref 3–16)
AST SERPL-CCNC: 26 U/L (ref 15–37)
BASOPHILS # BLD: 0 K/UL (ref 0–0.2)
BASOPHILS NFR BLD: 0.6 %
BILIRUB SERPL-MCNC: 0.7 MG/DL (ref 0–1)
BUN SERPL-MCNC: 9 MG/DL (ref 7–20)
CALCIUM SERPL-MCNC: 9.2 MG/DL (ref 8.3–10.6)
CHLORIDE SERPL-SCNC: 102 MMOL/L (ref 99–110)
CO2 SERPL-SCNC: 25 MMOL/L (ref 21–32)
CREAT SERPL-MCNC: 0.9 MG/DL (ref 0.6–1.1)
DEPRECATED RDW RBC AUTO: 15.5 % (ref 12.4–15.4)
EOSINOPHIL # BLD: 0.1 K/UL (ref 0–0.6)
EOSINOPHIL NFR BLD: 3.9 %
GFR SERPLBLD CREATININE-BSD FMLA CKD-EPI: >60 ML/MIN/{1.73_M2}
GLUCOSE SERPL-MCNC: 99 MG/DL (ref 70–99)
HCG SERPL QL: NEGATIVE
HCT VFR BLD AUTO: 34.8 % (ref 36–48)
HGB BLD-MCNC: 11.4 G/DL (ref 12–16)
LYMPHOCYTES # BLD: 1.1 K/UL (ref 1–5.1)
LYMPHOCYTES NFR BLD: 30.7 %
MCH RBC QN AUTO: 29.5 PG (ref 26–34)
MCHC RBC AUTO-ENTMCNC: 32.8 G/DL (ref 31–36)
MCV RBC AUTO: 89.8 FL (ref 80–100)
MONOCYTES # BLD: 0.3 K/UL (ref 0–1.3)
MONOCYTES NFR BLD: 8.2 %
NEUTROPHILS # BLD: 2 K/UL (ref 1.7–7.7)
NEUTROPHILS NFR BLD: 56.6 %
NT-PROBNP SERPL-MCNC: 52 PG/ML (ref 0–124)
PLATELET # BLD AUTO: 160 K/UL (ref 135–450)
PMV BLD AUTO: 7.6 FL (ref 5–10.5)
POTASSIUM SERPL-SCNC: 4 MMOL/L (ref 3.5–5.1)
PROT SERPL-MCNC: 7.5 G/DL (ref 6.4–8.2)
RBC # BLD AUTO: 3.88 M/UL (ref 4–5.2)
SODIUM SERPL-SCNC: 136 MMOL/L (ref 136–145)
TROPONIN, HIGH SENSITIVITY: <6 NG/L (ref 0–14)
TROPONIN, HIGH SENSITIVITY: <6 NG/L (ref 0–14)
WBC # BLD AUTO: 3.6 K/UL (ref 4–11)

## 2023-07-31 PROCEDURE — 71260 CT THORAX DX C+: CPT

## 2023-07-31 PROCEDURE — 6360000004 HC RX CONTRAST MEDICATION: Performed by: PHYSICIAN ASSISTANT

## 2023-07-31 PROCEDURE — 84484 ASSAY OF TROPONIN QUANT: CPT

## 2023-07-31 PROCEDURE — 80053 COMPREHEN METABOLIC PANEL: CPT

## 2023-07-31 PROCEDURE — 84703 CHORIONIC GONADOTROPIN ASSAY: CPT

## 2023-07-31 PROCEDURE — 83880 ASSAY OF NATRIURETIC PEPTIDE: CPT

## 2023-07-31 PROCEDURE — 93005 ELECTROCARDIOGRAM TRACING: CPT | Performed by: EMERGENCY MEDICINE

## 2023-07-31 PROCEDURE — 99285 EMERGENCY DEPT VISIT HI MDM: CPT

## 2023-07-31 PROCEDURE — 85025 COMPLETE CBC W/AUTO DIFF WBC: CPT

## 2023-07-31 RX ADMIN — IOPAMIDOL 75 ML: 755 INJECTION, SOLUTION INTRAVENOUS at 17:45

## 2023-07-31 ASSESSMENT — LIFESTYLE VARIABLES
HOW MANY STANDARD DRINKS CONTAINING ALCOHOL DO YOU HAVE ON A TYPICAL DAY: PATIENT DOES NOT DRINK
HOW OFTEN DO YOU HAVE A DRINK CONTAINING ALCOHOL: NEVER

## 2023-07-31 NOTE — ED PROVIDER NOTES
EKG interpretation by me in absence of a face-to-face evaluation by me as follows:     The 12 lead EKG was interpreted by me independent of a cardiologist as follows:  Rate: normal with a rate of 82  Rhythm: sinus  Axis: normal  Intervals: normal CA, narrow QRS, normal QTc  ST segments: no ST elevations or depressions  T waves: no abnormal inversions  Non-specific T wave changes: present  Prior EKG comparison: EKG dated 5/22/15 is not significantly different - nonspecific changes vs artifact differences       Chyna Espinal MD  07/31/23 2600
range of motion. Right lower leg: No edema. Left lower leg: No edema. Skin:     General: Skin is warm and dry. Findings: No erythema or rash. Neurological:      Mental Status: She is alert and oriented to person, place, and time. Cranial Nerves: No cranial nerve deficit. Psychiatric:         Behavior: Behavior normal.           DIAGNOSTIC RESULTS   LABS:    Labs Reviewed   COMPREHENSIVE METABOLIC PANEL W/ REFLEX TO MG FOR LOW K - Abnormal; Notable for the following components:       Result Value    Albumin/Globulin Ratio 1.0 (*)     All other components within normal limits   CBC WITH AUTO DIFFERENTIAL - Abnormal; Notable for the following components:    WBC 3.6 (*)     RBC 3.88 (*)     Hemoglobin 11.4 (*)     Hematocrit 34.8 (*)     RDW 15.5 (*)     All other components within normal limits   TROPONIN   TROPONIN   BRAIN NATRIURETIC PEPTIDE   HCG, SERUM, QUALITATIVE       When ordered only abnormal lab results are displayed. All other labs were within normal range or not returned as of this dictation. EKG: When ordered, EKG's are interpreted by the Emergency Department Physician in the absence of a cardiologist.  Please see their note for interpretation of EKG. RADIOLOGY:   Non-plain film images such as CT, Ultrasound and MRI are read by the radiologist. Plain radiographic images are visualized and preliminarily interpreted by the ED Provider with the below findings:        Interpretation per the Radiologist below, if available at the time of this note:    CT CHEST PULMONARY EMBOLISM W CONTRAST   Final Result   No evidence of pulmonary embolism or acute pulmonary abnormality. No results found. No results found. PROCEDURES   Unless otherwise noted below, none     Procedures    CRITICAL CARE TIME (.cctime)       PAST MEDICAL HISTORY      has a past medical history of H/O blood clots (2020).      EMERGENCY DEPARTMENT COURSE and DIFFERENTIAL DIAGNOSIS/MDM:   Vitals:

## 2023-08-01 ENCOUNTER — HOSPITAL ENCOUNTER (OUTPATIENT)
Dept: PHYSICAL THERAPY | Age: 43
Setting detail: THERAPIES SERIES
Discharge: HOME | End: 2023-08-01
Payer: COMMERCIAL

## 2023-08-01 LAB
EKG ATRIAL RATE: 82 BPM
EKG DIAGNOSIS: NORMAL
EKG P AXIS: 10 DEGREES
EKG P-R INTERVAL: 134 MS
EKG Q-T INTERVAL: 394 MS
EKG QRS DURATION: 78 MS
EKG QTC CALCULATION (BAZETT): 460 MS
EKG R AXIS: -6 DEGREES
EKG T AXIS: 7 DEGREES
EKG VENTRICULAR RATE: 82 BPM

## 2023-08-01 PROCEDURE — 93010 ELECTROCARDIOGRAM REPORT: CPT | Performed by: INTERNAL MEDICINE

## 2023-08-01 PROCEDURE — 97112 NEUROMUSCULAR REEDUCATION: CPT

## 2023-08-01 PROCEDURE — 97140 MANUAL THERAPY 1/> REGIONS: CPT

## 2023-08-01 NOTE — PROGRESS NOTES
- MOD (14198)  [] EVAL - HIGH (90886)  [] RE-EVAL (10435)  [x] KU(40630) x       [] Ionto  [x] NMR (50323) x  1    [] Vaso  [x] Manual (17484) x  1   [] Ultrasound  [] TA x        [] Mech Traction (94182)  [] Aquatic Therapy x     [] ES (un) (93752):   [] Home Management Training x  [] ES(attended) (95900)   [] Dry Needling 1-2 muscles (16247):  [] Dry Needling 3+ muscles (300985  [] Group:      [] Other:     Arnie Pae stated goal:  To get  better with movement and pain   [] Progressing: [] Met: [] Not Met: [] Adjusted     Therapist goals for Patient:   Short Term Goals: To be achieved in: 2 weeks  1. Independent in HEP and progression per patient tolerance, in order to prevent re-injury. [] Progressing: [] Met: [] Not Met: [] Adjusted  2. Patient will have a decrease in pain to facilitate improvement in movement, function, and ADLs as indicated by Functional Deficits. [] Progressing: [] Met: [] Not Met: [] Adjusted     Long Term Goals: To be achieved in: 12 weeks/ DC  1. Pt will improve NDI by 10 points to reduce disability and progress towards PLOF. [] Progressing: [] Met: [] Not Met: [] Adjusted  2. Patient will demonstrate increased AROM of cervical/ spine  by 10 deg in all planes to allow for proper joint functioning as indicated by patients Functional Deficits. [] Progressing: [] Met: [] Not Met: [] Adjusted  3. Patient will demonstrate an increase in postural awareness and control and activation of  Deep cervical stabilizers to allow for proper functional mobility as indicated by patients Functional Deficits. [] Progressing: [] Met: [] Not Met: [] Adjusted  4. Patient will return to functional activities including lifting and driving without increased symptoms or restriction. [] Progressing: [] Met: [] Not Met: [] Adjusted  5.   Patient to  tolerate sleeping through the night without interruption at least 6-7 hours   [] Progressing: [] Met: [] Not Met: [] Adjusted         Overall Progression Towards

## 2023-08-04 ENCOUNTER — HOSPITAL ENCOUNTER (OUTPATIENT)
Dept: PHYSICAL THERAPY | Age: 43
Setting detail: THERAPIES SERIES
Discharge: HOME | End: 2023-08-04
Payer: COMMERCIAL

## 2023-08-04 PROCEDURE — 97140 MANUAL THERAPY 1/> REGIONS: CPT

## 2023-08-04 PROCEDURE — 97530 THERAPEUTIC ACTIVITIES: CPT

## 2023-08-04 PROCEDURE — 97110 THERAPEUTIC EXERCISES: CPT

## 2023-08-04 NOTE — FLOWSHEET NOTE
strengthening as necessary to restore normal cervicothoracic mobility. Treatment/Activity Tolerance:  [x] Patient able to complete tx  [] Patient limited by fatique  [] Patient limited by pain   [] Patient limited by other medical complications  [] Other:     Prognosis: [] Good [] Fair  [] Poor    Patient Requires Follow-up: [x] Yes  [] No    PLAN: PT 2x / week for 6 weeks. Continue with postural exercises, cervical stabilization, shoulder ROM, and manual intervention    [x] Continue per plan of care [] Alter current plan (see comments)  [] Plan of care initiated [] Hold pending MD visit [] Discharge    Electronically signed by: Meli Judge PT, DPT  OMT-C, [facility] [id number]      Note: If patient does not return for scheduled/ recommended follow up visits, this note will serve as a discharge from care along with most recent update on progress.

## 2023-08-09 ENCOUNTER — HOSPITAL ENCOUNTER (OUTPATIENT)
Dept: PHYSICAL THERAPY | Age: 43
Setting detail: THERAPIES SERIES
Discharge: HOME | End: 2023-08-09
Payer: COMMERCIAL

## 2023-08-09 PROCEDURE — 97112 NEUROMUSCULAR REEDUCATION: CPT

## 2023-08-09 PROCEDURE — 97110 THERAPEUTIC EXERCISES: CPT

## 2023-08-09 PROCEDURE — 97140 MANUAL THERAPY 1/> REGIONS: CPT
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
## 2023-08-11 ENCOUNTER — HOSPITAL ENCOUNTER (OUTPATIENT)
Dept: PHYSICAL THERAPY | Age: 43
Setting detail: THERAPIES SERIES
Discharge: HOME | End: 2023-08-11
Payer: COMMERCIAL

## 2023-08-11 PROCEDURE — 97110 THERAPEUTIC EXERCISES: CPT

## 2023-08-11 PROCEDURE — 97112 NEUROMUSCULAR REEDUCATION: CPT

## 2023-08-11 NOTE — FLOWSHEET NOTE
reaching, carrying, lifting, house/yardwork, driving/computer work      Manual Treatments:  PROM / STM / Oscillations-Mobs:  G-I, II, III, IV (PA's, Inf., Post.)  [x] (79012) Provided manual therapy to mobilize soft tissue/joints of cervical/CT, scapular GHJ and UE for the purpose of decreasing headache, modulating pain, promoting relaxation,  increasing ROM, reducing/eliminating soft tissue swelling/inflammation/restriction, improving soft tissue extensibility and allowing for proper ROM for normal function with self care, reaching, carrying, lifting, house/yardwork, driving/computer work    Charges:  Timed Code Treatment Minutes: 41   Total Treatment Minutes: 41     [] EVAL - LOW (84935)   [] EVAL - MOD (69424)  [] EVAL - HIGH (90367)  [] RE-EVAL (48718)  [x] IW(25958) x 2      [] Ionto  [x] NMR (35396) x  1    [] Vaso  [x] Manual (20459) x     [] Ultrasound  [] TA x        [] Mech Traction (63336)  [] Aquatic Therapy x     [] ES (un) (33162):   [] Home Management Training x  [] ES(attended) (33852)   [] Dry Needling 1-2 muscles (63999):  [] Dry Needling 3+ muscles (542598  [] Group:      [] Other:     Ramona December stated goal:  To get  better with movement and pain   [] Progressing: [] Met: [] Not Met: [] Adjusted     Therapist goals for Patient:   Short Term Goals: To be achieved in: 2 weeks  1. Independent in HEP and progression per patient tolerance, in order to prevent re-injury. [] Progressing: [] Met: [] Not Met: [] Adjusted  2. Patient will have a decrease in pain to facilitate improvement in movement, function, and ADLs as indicated by Functional Deficits. [] Progressing: [] Met: [] Not Met: [] Adjusted     Long Term Goals: To be achieved in: 12 weeks/ DC  1. Pt will improve NDI by 10 points to reduce disability and progress towards PLOF. [] Progressing: [] Met: [] Not Met: [] Adjusted  2.  Patient will demonstrate increased AROM of cervical/ spine  by 10 deg in all planes to allow for proper joint

## 2023-08-16 ENCOUNTER — HOSPITAL ENCOUNTER (OUTPATIENT)
Dept: PHYSICAL THERAPY | Age: 43
Setting detail: THERAPIES SERIES
Discharge: HOME | End: 2023-08-16
Payer: COMMERCIAL

## 2023-08-16 PROCEDURE — 97110 THERAPEUTIC EXERCISES: CPT

## 2023-08-16 PROCEDURE — 97112 NEUROMUSCULAR REEDUCATION: CPT
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
## 2023-08-18 ENCOUNTER — HOSPITAL ENCOUNTER (OUTPATIENT)
Dept: PHYSICAL THERAPY | Age: 43
Setting detail: THERAPIES SERIES
Discharge: HOME | End: 2023-08-18
Payer: COMMERCIAL

## 2023-08-18 NOTE — PROGRESS NOTES
[address] [facility]     Physical Therapy  Cancellation/No-show Note  Patient Name:  Alanis Osorio  :  1980   Date:  2023  Cancelled visits to date: 0  No-shows to date: 6    Patient status for today's appointment patient:  []  Cancelled  []  Rescheduled appointment  [x]  No-show 5/10, , , 7/3, 7/10, ,      Reason given by patient:  []  Patient ill  []  Conflicting appointment  []  No transportation    []  Conflict with work  []  No reason given  [x]  Other: \"can't make it\"     Comments:      Phone call information:   []  Phone call made today to patient at _ time at number provided:      []  Patient answered, conversation as follows:    []  Patient did not answer, message left as follows:  []  Phone call not made today  [x]  Phone call not needed - pt contacted us to cancel and provided reason for cancellation.      Electronically signed by:  Diana Vail, PT, DPT, OCS, OMT-C

## 2023-12-11 ENCOUNTER — APPOINTMENT (OUTPATIENT)
Dept: CT IMAGING | Age: 43
End: 2023-12-11

## 2023-12-11 ENCOUNTER — HOSPITAL ENCOUNTER (EMERGENCY)
Age: 43
Discharge: HOME | End: 2023-12-11
Attending: STUDENT IN AN ORGANIZED HEALTH CARE EDUCATION/TRAINING PROGRAM

## 2023-12-11 VITALS
TEMPERATURE: 97.5 F | OXYGEN SATURATION: 98 % | SYSTOLIC BLOOD PRESSURE: 112 MMHG | HEART RATE: 103 BPM | RESPIRATION RATE: 16 BRPM | BODY MASS INDEX: 28.41 KG/M2 | WEIGHT: 181 LBS | HEIGHT: 67 IN | DIASTOLIC BLOOD PRESSURE: 84 MMHG

## 2023-12-11 DIAGNOSIS — K52.9 COLITIS: ICD-10-CM

## 2023-12-11 DIAGNOSIS — N30.00 ACUTE CYSTITIS WITHOUT HEMATURIA: ICD-10-CM

## 2023-12-11 DIAGNOSIS — R10.33 PERIUMBILICAL ABDOMINAL PAIN: Primary | ICD-10-CM

## 2023-12-11 LAB
ALBUMIN SERPL-MCNC: 3.7 G/DL (ref 3.4–5)
ALP SERPL-CCNC: 108 U/L (ref 40–129)
ALT SERPL-CCNC: 14 U/L (ref 10–40)
ANION GAP SERPL CALCULATED.3IONS-SCNC: 13 MMOL/L (ref 3–16)
AST SERPL-CCNC: 31 U/L (ref 15–37)
BACTERIA URNS QL MICRO: ABNORMAL /HPF
BASOPHILS # BLD: 0 K/UL (ref 0–0.2)
BASOPHILS NFR BLD: 0 %
BILIRUB DIRECT SERPL-MCNC: <0.2 MG/DL (ref 0–0.3)
BILIRUB INDIRECT SERPL-MCNC: NORMAL MG/DL (ref 0–1)
BILIRUB SERPL-MCNC: 0.3 MG/DL (ref 0–1)
BILIRUB UR QL STRIP.AUTO: NEGATIVE
BUN SERPL-MCNC: 8 MG/DL (ref 7–20)
CALCIUM SERPL-MCNC: 8.6 MG/DL (ref 8.3–10.6)
CHLORIDE SERPL-SCNC: 103 MMOL/L (ref 99–110)
CLARITY UR: CLEAR
CO2 SERPL-SCNC: 20 MMOL/L (ref 21–32)
COLOR UR: YELLOW
CREAT SERPL-MCNC: 0.6 MG/DL (ref 0.6–1.1)
DEPRECATED RDW RBC AUTO: 16.9 % (ref 12.4–15.4)
EOSINOPHIL # BLD: 0.1 K/UL (ref 0–0.6)
EOSINOPHIL NFR BLD: 0.7 %
EPI CELLS #/AREA URNS HPF: ABNORMAL /HPF (ref 0–5)
GFR SERPLBLD CREATININE-BSD FMLA CKD-EPI: >60 ML/MIN/{1.73_M2}
GLUCOSE SERPL-MCNC: 107 MG/DL (ref 70–99)
GLUCOSE UR STRIP.AUTO-MCNC: NEGATIVE MG/DL
HCG SERPL QL: NEGATIVE
HCT VFR BLD AUTO: 34.5 % (ref 36–48)
HGB BLD-MCNC: 11.4 G/DL (ref 12–16)
HGB UR QL STRIP.AUTO: NEGATIVE
KETONES UR STRIP.AUTO-MCNC: NEGATIVE MG/DL
LEUKOCYTE ESTERASE UR QL STRIP.AUTO: NEGATIVE
LIPASE SERPL-CCNC: 22 U/L (ref 13–60)
LYMPHOCYTES # BLD: 0.5 K/UL (ref 1–5.1)
LYMPHOCYTES NFR BLD: 7.3 %
MCH RBC QN AUTO: 30.1 PG (ref 26–34)
MCHC RBC AUTO-ENTMCNC: 33.1 G/DL (ref 31–36)
MCV RBC AUTO: 90.9 FL (ref 80–100)
MONOCYTES # BLD: 0.2 K/UL (ref 0–1.3)
MONOCYTES NFR BLD: 3.5 %
NEUTROPHILS # BLD: 6.2 K/UL (ref 1.7–7.7)
NEUTROPHILS NFR BLD: 88.5 %
NITRITE UR QL STRIP.AUTO: POSITIVE
PH UR STRIP.AUTO: 8 [PH] (ref 5–8)
PLATELET # BLD AUTO: 140 K/UL (ref 135–450)
PMV BLD AUTO: 8 FL (ref 5–10.5)
POTASSIUM SERPL-SCNC: 5 MMOL/L (ref 3.5–5.1)
PROT SERPL-MCNC: 8 G/DL (ref 6.4–8.2)
PROT UR STRIP.AUTO-MCNC: NEGATIVE MG/DL
RBC # BLD AUTO: 3.79 M/UL (ref 4–5.2)
RBC #/AREA URNS HPF: ABNORMAL /HPF (ref 0–4)
SODIUM SERPL-SCNC: 136 MMOL/L (ref 136–145)
SP GR UR STRIP.AUTO: 1.01 (ref 1–1.03)
UA COMPLETE W REFLEX CULTURE PNL UR: ABNORMAL
UA DIPSTICK W REFLEX MICRO PNL UR: YES
URN SPEC COLLECT METH UR: ABNORMAL
UROBILINOGEN UR STRIP-ACNC: 0.2 E.U./DL
WBC # BLD AUTO: 7 K/UL (ref 4–11)
WBC #/AREA URNS HPF: ABNORMAL /HPF (ref 0–5)

## 2023-12-11 PROCEDURE — 99285 EMERGENCY DEPT VISIT HI MDM: CPT

## 2023-12-11 PROCEDURE — 83690 ASSAY OF LIPASE: CPT

## 2023-12-11 PROCEDURE — 74177 CT ABD & PELVIS W/CONTRAST: CPT

## 2023-12-11 PROCEDURE — 87086 URINE CULTURE/COLONY COUNT: CPT

## 2023-12-11 PROCEDURE — 80076 HEPATIC FUNCTION PANEL: CPT

## 2023-12-11 PROCEDURE — 87186 SC STD MICRODIL/AGAR DIL: CPT

## 2023-12-11 PROCEDURE — 6360000002 HC RX W HCPCS: Performed by: STUDENT IN AN ORGANIZED HEALTH CARE EDUCATION/TRAINING PROGRAM

## 2023-12-11 PROCEDURE — 84703 CHORIONIC GONADOTROPIN ASSAY: CPT

## 2023-12-11 PROCEDURE — 87077 CULTURE AEROBIC IDENTIFY: CPT

## 2023-12-11 PROCEDURE — 2580000003 HC RX 258: Performed by: STUDENT IN AN ORGANIZED HEALTH CARE EDUCATION/TRAINING PROGRAM

## 2023-12-11 PROCEDURE — 96375 TX/PRO/DX INJ NEW DRUG ADDON: CPT

## 2023-12-11 PROCEDURE — 80048 BASIC METABOLIC PNL TOTAL CA: CPT

## 2023-12-11 PROCEDURE — 96374 THER/PROPH/DIAG INJ IV PUSH: CPT

## 2023-12-11 PROCEDURE — 6360000004 HC RX CONTRAST MEDICATION: Performed by: STUDENT IN AN ORGANIZED HEALTH CARE EDUCATION/TRAINING PROGRAM

## 2023-12-11 PROCEDURE — 81001 URINALYSIS AUTO W/SCOPE: CPT

## 2023-12-11 PROCEDURE — 85025 COMPLETE CBC W/AUTO DIFF WBC: CPT

## 2023-12-11 RX ORDER — ONDANSETRON 2 MG/ML
4 INJECTION INTRAMUSCULAR; INTRAVENOUS ONCE
Status: DISCONTINUED | OUTPATIENT
Start: 2023-12-11 | End: 2023-12-11

## 2023-12-11 RX ORDER — CEPHALEXIN 250 MG/1
500 CAPSULE ORAL 2 TIMES DAILY
Qty: 20 CAPSULE | Refills: 0 | Status: SHIPPED | OUTPATIENT
Start: 2023-12-11 | End: 2023-12-16

## 2023-12-11 RX ORDER — DIPHENHYDRAMINE HYDROCHLORIDE 50 MG/ML
25 INJECTION INTRAMUSCULAR; INTRAVENOUS ONCE
Status: COMPLETED | OUTPATIENT
Start: 2023-12-11 | End: 2023-12-11

## 2023-12-11 RX ORDER — DROPERIDOL 2.5 MG/ML
1.25 INJECTION, SOLUTION INTRAMUSCULAR; INTRAVENOUS ONCE
Status: COMPLETED | OUTPATIENT
Start: 2023-12-11 | End: 2023-12-11

## 2023-12-11 RX ORDER — MORPHINE SULFATE 4 MG/ML
4 INJECTION INTRAVENOUS ONCE
Status: DISCONTINUED | OUTPATIENT
Start: 2023-12-11 | End: 2023-12-11

## 2023-12-11 RX ORDER — ONDANSETRON HYDROCHLORIDE 8 MG/1
8 TABLET, FILM COATED ORAL EVERY 8 HOURS PRN
Qty: 20 TABLET | Refills: 0 | Status: SHIPPED | OUTPATIENT
Start: 2023-12-11

## 2023-12-11 RX ORDER — SODIUM CHLORIDE, SODIUM LACTATE, POTASSIUM CHLORIDE, AND CALCIUM CHLORIDE .6; .31; .03; .02 G/100ML; G/100ML; G/100ML; G/100ML
1000 INJECTION, SOLUTION INTRAVENOUS ONCE
Status: COMPLETED | OUTPATIENT
Start: 2023-12-11 | End: 2023-12-11

## 2023-12-11 RX ADMIN — DIPHENHYDRAMINE HYDROCHLORIDE 25 MG: 50 INJECTION INTRAMUSCULAR; INTRAVENOUS at 03:30

## 2023-12-11 RX ADMIN — DROPERIDOL 1.25 MG: 2.5 INJECTION, SOLUTION INTRAMUSCULAR; INTRAVENOUS at 03:48

## 2023-12-11 RX ADMIN — SODIUM CHLORIDE, POTASSIUM CHLORIDE, SODIUM LACTATE AND CALCIUM CHLORIDE 1000 ML: 600; 310; 30; 20 INJECTION, SOLUTION INTRAVENOUS at 03:24

## 2023-12-11 RX ADMIN — SODIUM CHLORIDE, POTASSIUM CHLORIDE, SODIUM LACTATE AND CALCIUM CHLORIDE 1000 ML: 600; 310; 30; 20 INJECTION, SOLUTION INTRAVENOUS at 06:31

## 2023-12-11 RX ADMIN — IOPAMIDOL 75 ML: 755 INJECTION, SOLUTION INTRAVENOUS at 06:18

## 2023-12-11 ASSESSMENT — ENCOUNTER SYMPTOMS
DIARRHEA: 0
CONSTIPATION: 0
ABDOMINAL PAIN: 1
VOMITING: 0

## 2023-12-11 ASSESSMENT — PAIN DESCRIPTION - LOCATION
LOCATION: ABDOMEN;BACK
LOCATION: ABDOMEN

## 2023-12-11 ASSESSMENT — PAIN DESCRIPTION - PAIN TYPE: TYPE: ACUTE PAIN

## 2023-12-11 ASSESSMENT — LIFESTYLE VARIABLES
HOW OFTEN DO YOU HAVE A DRINK CONTAINING ALCOHOL: MONTHLY OR LESS
HOW MANY STANDARD DRINKS CONTAINING ALCOHOL DO YOU HAVE ON A TYPICAL DAY: 1 OR 2

## 2023-12-11 ASSESSMENT — PAIN SCALES - GENERAL
PAINLEVEL_OUTOF10: 10
PAINLEVEL_OUTOF10: 3

## 2023-12-11 ASSESSMENT — PAIN DESCRIPTION - ORIENTATION: ORIENTATION: MID

## 2023-12-11 ASSESSMENT — PAIN DESCRIPTION - DESCRIPTORS: DESCRIPTORS: ACHING

## 2023-12-11 ASSESSMENT — PAIN - FUNCTIONAL ASSESSMENT: PAIN_FUNCTIONAL_ASSESSMENT: 0-10

## 2023-12-11 NOTE — DISCHARGE INSTRUCTIONS
Urinary tract infections, also called \"UTIs,\" are infections that affect either the bladder or the kidneys:  ?Bladder infections are more common than kidney infections. They happen when bacteria get into the urethra and travel up into the bladder. The medical term for bladder infection is \"cystitis. \"  ? Kidney infections happen when the bacteria travel even higher, up into the kidneys. The medical term for kidney infection is \"pyelonephritis. \"   Both bladder and kidney infections are more common in women than men. What are the symptoms of a bladder infection? ?Pain or a burning feeling when you urinate  ? The need to urinate often  ? The need to urinate suddenly or in a hurry  ? Blood in the urine    The symptoms of a kidney infection can include the symptoms of a bladder infection, but kidney infections can also cause:  ? Fever  ? Back pain  ? Nausea or vomiting    How are urinary tract infections treated? Most urinary tract infections are treated with antibiotic pills. These pills work by killing the germs that cause the infection. If you have a bladder infection, you will probably need to take antibiotics for 3 to 7 days. If you have a kidney infection, you will probably need to take antibiotics for longer - maybe for up to 2 weeks. If you have a kidney infection, it's also possible you will need to be treated in the hospital. Your symptoms should begin to improve within a day of starting antibiotics. But you should finish all the antibiotic pills you get. Otherwise your infection might come back. If needed, you can also take a medicine to numb your bladder. This medicine eases the pain caused by urinary tract infections. It also reduces the need to urinate. What if I get bladder infections a lot? ? Avoiding spermicides (sperm-killing creams) - Spermicide is a form of birth control. It seems to increase the risk of bladder infections in some women, especially when used with a diaphragm.  If you use spermicide

## 2023-12-11 NOTE — ED NOTES
Patient prepared for and ready to be discharged. Patient discharged at this time in no acute distress after verbalizing understanding of discharge instructions. Patient left after receiving After Visit Summary instructions.        Kady Reyes RN  12/11/23 8705

## 2023-12-11 NOTE — ED PROVIDER NOTES
[facility] ENCOUNTER          ATTENDING PHYSICIAN NOTE       Date of evaluation: 12/11/2023    ADDENDUM:      Care of this patient was assumed from my colleague, Dr. Trejo Sites The patient was seen for Abdominal Pain and Back Pain  . The patient's initial evaluation and plan have been discussed with the prior provider who initially evaluated the patient. Nursing Notes, Past Medical Hx, Past Surgical Hx, Social Hx, Allergies, and Family Hx were all reviewed. At the time of turnover, the remaining items are still pending in her work-up and will be followed up by myself: f/u UA, PO challenge, dispo w/ anticipated discharge. Diagnostic Results     Labs:  Please see electronic medical record for any tests performed in the ED     Radiology:  Please see electronic medical record for any tests performed in the ED    Procedures     ED Course     ED Course as of 12/11/23 0658   Mon Dec 11, 2023   0600 CBC at this time revealed stable hemoglobin with no evidence of any leukocytosis. LFTs unremarkable renal panel negative. Lipase and pregnancy test are negative as well. Patient given droperidol IV fluids and Benadryl with improvement of symptoms. [EI]      ED Course User Index  [EI] Elias Damian MD       The patient was given the following medications:  Orders Placed This Encounter   Medications    DISCONTD: ondansetron (ZOFRAN) injection 4 mg    DISCONTD: morphine injection 4 mg    lactated ringers bolus bolus 1,000 mL    droperidol (INAPSINE) injection 1.25 mg    diphenhydrAMINE (BENADRYL) injection 25 mg    iopamidol (ISOVUE-370) 76 % injection 75 mL    lactated ringers bolus bolus 1,000 mL       CONSULTS:  None    MEDICAL Veronica Cole / ASSESSMENT / Elliot Diver is a 44 y.o. female ***. Is this patient to be included in the SEP-1 core measure? {Sep-1 Core Yes/No:984704}    Risk:  The patient presented for concern of ***.     Problem: {Problem

## 2023-12-11 NOTE — ED NOTES
Patient ambulated independently with steady gait to restroom to provide urine sample.       Dieter Melo RN  12/11/23 7021

## 2023-12-13 LAB
BACTERIA UR CULT: ABNORMAL
ORGANISM: ABNORMAL

## 2024-06-13 ENCOUNTER — TELEPHONE (OUTPATIENT)
Dept: BARIATRICS/WEIGHT MGMT | Age: 44
End: 2024-06-13

## 2025-03-19 ENCOUNTER — OFFICE VISIT (OUTPATIENT)
Dept: INTERNAL MEDICINE CLINIC | Age: 45
End: 2025-03-19
Payer: COMMERCIAL

## 2025-03-19 VITALS
BODY MASS INDEX: 37.98 KG/M2 | DIASTOLIC BLOOD PRESSURE: 78 MMHG | HEART RATE: 90 BPM | OXYGEN SATURATION: 97 % | SYSTOLIC BLOOD PRESSURE: 108 MMHG | HEIGHT: 67 IN | WEIGHT: 242 LBS

## 2025-03-19 DIAGNOSIS — Z23 NEED FOR PROPHYLACTIC VACCINATION AGAINST STREPTOCOCCUS PNEUMONIAE (PNEUMOCOCCUS): ICD-10-CM

## 2025-03-19 DIAGNOSIS — E66.9 OBESITY (BMI 30-39.9): Primary | ICD-10-CM

## 2025-03-19 DIAGNOSIS — D50.8 IRON DEFICIENCY ANEMIA SECONDARY TO INADEQUATE DIETARY IRON INTAKE: ICD-10-CM

## 2025-03-19 DIAGNOSIS — Z12.31 ENCOUNTER FOR SCREENING MAMMOGRAM FOR MALIGNANT NEOPLASM OF BREAST: ICD-10-CM

## 2025-03-19 DIAGNOSIS — Z13.21 ENCOUNTER FOR VITAMIN DEFICIENCY SCREENING: ICD-10-CM

## 2025-03-19 DIAGNOSIS — Z13.1 SCREENING FOR DIABETES MELLITUS: ICD-10-CM

## 2025-03-19 DIAGNOSIS — Z76.89 ENCOUNTER TO ESTABLISH CARE: ICD-10-CM

## 2025-03-19 PROCEDURE — G8427 DOCREV CUR MEDS BY ELIG CLIN: HCPCS | Performed by: NURSE PRACTITIONER

## 2025-03-19 PROCEDURE — 90677 PCV20 VACCINE IM: CPT | Performed by: NURSE PRACTITIONER

## 2025-03-19 PROCEDURE — 90471 IMMUNIZATION ADMIN: CPT | Performed by: NURSE PRACTITIONER

## 2025-03-19 PROCEDURE — 1036F TOBACCO NON-USER: CPT | Performed by: NURSE PRACTITIONER

## 2025-03-19 PROCEDURE — G8417 CALC BMI ABV UP PARAM F/U: HCPCS | Performed by: NURSE PRACTITIONER

## 2025-03-19 PROCEDURE — 99213 OFFICE O/P EST LOW 20 MIN: CPT | Performed by: NURSE PRACTITIONER

## 2025-03-19 RX ORDER — ACETAMINOPHEN 500 MG
500 TABLET ORAL EVERY 4 HOURS PRN
COMMUNITY
Start: 2025-03-18

## 2025-03-19 RX ORDER — IBUPROFEN 600 MG/1
600 TABLET ORAL EVERY 6 HOURS PRN
COMMUNITY
Start: 2025-03-18

## 2025-03-19 SDOH — ECONOMIC STABILITY: FOOD INSECURITY: WITHIN THE PAST 12 MONTHS, THE FOOD YOU BOUGHT JUST DIDN'T LAST AND YOU DIDN'T HAVE MONEY TO GET MORE.: PATIENT DECLINED

## 2025-03-19 SDOH — ECONOMIC STABILITY: FOOD INSECURITY: WITHIN THE PAST 12 MONTHS, YOU WORRIED THAT YOUR FOOD WOULD RUN OUT BEFORE YOU GOT MONEY TO BUY MORE.: PATIENT DECLINED

## 2025-03-19 ASSESSMENT — PATIENT HEALTH QUESTIONNAIRE - PHQ9
2. FEELING DOWN, DEPRESSED OR HOPELESS: NOT AT ALL
SUM OF ALL RESPONSES TO PHQ QUESTIONS 1-9: 0
1. LITTLE INTEREST OR PLEASURE IN DOING THINGS: NOT AT ALL
SUM OF ALL RESPONSES TO PHQ QUESTIONS 1-9: 0

## 2025-03-19 ASSESSMENT — ANXIETY QUESTIONNAIRES
3. WORRYING TOO MUCH ABOUT DIFFERENT THINGS: NOT AT ALL
4. TROUBLE RELAXING: NOT AT ALL
1. FEELING NERVOUS, ANXIOUS, OR ON EDGE: NOT AT ALL
IF YOU CHECKED OFF ANY PROBLEMS ON THIS QUESTIONNAIRE, HOW DIFFICULT HAVE THESE PROBLEMS MADE IT FOR YOU TO DO YOUR WORK, TAKE CARE OF THINGS AT HOME, OR GET ALONG WITH OTHER PEOPLE: NOT DIFFICULT AT ALL
5. BEING SO RESTLESS THAT IT IS HARD TO SIT STILL: NOT AT ALL
6. BECOMING EASILY ANNOYED OR IRRITABLE: NOT AT ALL
GAD7 TOTAL SCORE: 0
7. FEELING AFRAID AS IF SOMETHING AWFUL MIGHT HAPPEN: NOT AT ALL
2. NOT BEING ABLE TO STOP OR CONTROL WORRYING: NOT AT ALL

## 2025-03-19 ASSESSMENT — ENCOUNTER SYMPTOMS
EYES NEGATIVE: 1
ALLERGIC/IMMUNOLOGIC NEGATIVE: 1
RESPIRATORY NEGATIVE: 1
GASTROINTESTINAL NEGATIVE: 1

## 2025-03-19 NOTE — ASSESSMENT & PLAN NOTE
Chronic, not at goal (unstable), needs to start exercising and lifestyle modifications recommended    Orders:    Lipid, Fasting; Future

## 2025-03-19 NOTE — ASSESSMENT & PLAN NOTE
Chronic, not at goal (unstable), continue current plan pending work up below    Orders:    CBC with Auto Differential; Future    Iron and TIBC; Future

## 2025-03-19 NOTE — PATIENT INSTRUCTIONS
Start exercising for 190 minutes a week  Continue to drink plenty  of water  Increase vegetable intake

## 2025-03-19 NOTE — PROGRESS NOTES
Rate and Rhythm: Normal rate and regular rhythm.      Heart sounds: Normal heart sounds.   Pulmonary:      Effort: Pulmonary effort is normal.      Breath sounds: Normal breath sounds and air entry.   Musculoskeletal:      Right lower leg: No edema.      Left lower leg: No edema.   Skin:     General: Skin is warm and dry.   Neurological:      Mental Status: She is alert and oriented to person, place, and time.                Vitals:    03/19/25 1005   BP: 108/78   Pulse: 90   SpO2: 97%      Wt Readings from Last 3 Encounters:   03/19/25 109.8 kg (242 lb)   12/11/23 82.1 kg (181 lb)   07/28/23 108.4 kg (239 lb)      BP Readings from Last 3 Encounters:   03/19/25 108/78   12/11/23 112/84   07/31/23 (!) 169/54      An electronic signature was used to authenticate this note.    --Chaim Oswald, RAMAN - CNP

## 2025-03-20 DIAGNOSIS — Z13.1 SCREENING FOR DIABETES MELLITUS: ICD-10-CM

## 2025-03-20 DIAGNOSIS — D50.8 IRON DEFICIENCY ANEMIA SECONDARY TO INADEQUATE DIETARY IRON INTAKE: ICD-10-CM

## 2025-03-20 DIAGNOSIS — Z13.21 ENCOUNTER FOR VITAMIN DEFICIENCY SCREENING: ICD-10-CM

## 2025-03-20 DIAGNOSIS — E66.9 OBESITY (BMI 30-39.9): ICD-10-CM

## 2025-03-20 LAB
25(OH)D3 SERPL-MCNC: 9.9 NG/ML
BASOPHILS # BLD: 0 K/UL (ref 0–0.2)
BASOPHILS NFR BLD: 0.5 %
CHOLEST SERPL-MCNC: 189 MG/DL (ref 0–199)
DEPRECATED RDW RBC AUTO: 16.1 % (ref 12.4–15.4)
EOSINOPHIL # BLD: 0.2 K/UL (ref 0–0.6)
EOSINOPHIL NFR BLD: 4.4 %
EST. AVERAGE GLUCOSE BLD GHB EST-MCNC: 102.5 MG/DL
HBA1C MFR BLD: 5.2 %
HCT VFR BLD AUTO: 33.5 % (ref 36–48)
HDLC SERPL-MCNC: 66 MG/DL (ref 40–60)
HGB BLD-MCNC: 11.1 G/DL (ref 12–16)
IRON SATN MFR SERPL: 13 % (ref 15–50)
IRON SERPL-MCNC: 45 UG/DL (ref 37–145)
LDL CHOLESTEROL: 109 MG/DL
LYMPHOCYTES # BLD: 1 K/UL (ref 1–5.1)
LYMPHOCYTES NFR BLD: 28.6 %
MCH RBC QN AUTO: 29.9 PG (ref 26–34)
MCHC RBC AUTO-ENTMCNC: 33 G/DL (ref 31–36)
MCV RBC AUTO: 90.3 FL (ref 80–100)
MONOCYTES # BLD: 0.3 K/UL (ref 0–1.3)
MONOCYTES NFR BLD: 8.4 %
NEUTROPHILS # BLD: 2.1 K/UL (ref 1.7–7.7)
NEUTROPHILS NFR BLD: 58.1 %
PLATELET # BLD AUTO: 134 K/UL (ref 135–450)
PMV BLD AUTO: 7.9 FL (ref 5–10.5)
RBC # BLD AUTO: 3.71 M/UL (ref 4–5.2)
TIBC SERPL-MCNC: 359 UG/DL (ref 260–445)
TRIGL SERPL-MCNC: 71 MG/DL (ref 0–150)
VLDLC SERPL CALC-MCNC: 14 MG/DL
WBC # BLD AUTO: 3.5 K/UL (ref 4–11)

## 2025-03-21 ENCOUNTER — RESULTS FOLLOW-UP (OUTPATIENT)
Dept: INTERNAL MEDICINE CLINIC | Age: 45
End: 2025-03-21

## 2025-03-21 DIAGNOSIS — E55.9 VITAMIN D DEFICIENCY: ICD-10-CM

## 2025-03-21 DIAGNOSIS — D50.9 IRON DEFICIENCY ANEMIA, UNSPECIFIED IRON DEFICIENCY ANEMIA TYPE: Primary | ICD-10-CM

## 2025-03-21 RX ORDER — FERROUS SULFATE 325(65) MG
325 TABLET ORAL 2 TIMES DAILY
Qty: 180 TABLET | Refills: 1 | Status: SHIPPED | OUTPATIENT
Start: 2025-03-21 | End: 2025-03-24 | Stop reason: SDUPTHER

## 2025-03-21 RX ORDER — ERGOCALCIFEROL 1.25 MG/1
50000 CAPSULE, LIQUID FILLED ORAL WEEKLY
Qty: 12 CAPSULE | Refills: 1 | Status: SHIPPED | OUTPATIENT
Start: 2025-03-21 | End: 2025-03-24 | Stop reason: SDUPTHER

## 2025-03-24 NOTE — TELEPHONE ENCOUNTER
Recent Visits  Date Type Provider Dept   03/19/25 Office Visit Chaim Oswald APRN - CNP [facility]   Showing recent visits within past 540 days with a meds authorizing provider and meeting all other requirements  Future Appointments  Date Type Provider Dept   04/18/25 Appointment Chaim Oswald APRN - CNP [facility]   Showing future appointments within next 150 days with a meds authorizing provider and meeting all other requirements     3/19/2025

## 2025-03-27 RX ORDER — ERGOCALCIFEROL 1.25 MG/1
50000 CAPSULE, LIQUID FILLED ORAL WEEKLY
Qty: 12 CAPSULE | Refills: 1 | Status: SHIPPED | OUTPATIENT
Start: 2025-03-27

## 2025-03-27 RX ORDER — FERROUS SULFATE 325(65) MG
325 TABLET ORAL 2 TIMES DAILY
Qty: 180 TABLET | Refills: 1 | Status: SHIPPED | OUTPATIENT
Start: 2025-03-27

## 2025-06-06 ENCOUNTER — PATIENT MESSAGE (OUTPATIENT)
Dept: INTERNAL MEDICINE CLINIC | Age: 45
End: 2025-06-06

## 2025-06-06 RX ORDER — AZITHROMYCIN 250 MG/1
TABLET, FILM COATED ORAL
Qty: 6 TABLET | Refills: 0 | Status: SHIPPED | OUTPATIENT
Start: 2025-06-06 | End: 2025-06-06

## 2025-06-06 RX ORDER — AZITHROMYCIN 250 MG/1
TABLET, FILM COATED ORAL
Qty: 6 TABLET | Refills: 0 | Status: SHIPPED | OUTPATIENT
Start: 2025-06-06 | End: 2025-06-16

## 2025-06-06 NOTE — ADDENDUM NOTE
Addended by: FLORIAN ENRIQUEZ on: 6/6/2025 04:27 PM     Modules accepted: Orders    
Addended by: JILLIAN ORELLANA on: 6/6/2025 04:29 PM     Modules accepted: Orders    
aleeve, voltaren, tylenol, and lyrica, ice/heat. She had prior imaging in .      History of Present Illness    Health Maintenance -   Alcohol/Substance use History - None/Minimal    Tobacco Use      Smoking status: Former        Packs/day: 0.00        Years: 1 pack/day for 40.0 years (40.0 ttl pk-yrs)        Types: Cigarettes        Start date: 2/10/1978        Quit date: 2/10/2018        Years since quittin.9      Smokeless tobacco: Never    Family History   Problem Relation Age of Onset    Breast Cancer Mother     Lung Cancer Mother     Cancer Brother         choroidal melanoma           2025     7:41 PM 3/14/2024     8:21 AM 2017     9:28 AM   PHQ Scores   PHQ2 Score 0 0 0   PHQ9 Score 0 0 0     Interpretation of Total Score Depression Severity: 1-4 = Minimal depression, 5-9 = Mild depression, 10-14 = Moderate depression, 15-19 = Moderately severe depression, 20-27 = Severe depression    Review of Systems  Constitutional: Negative for activity change, appetite change, chills, diaphoresis, fatigue, fever and unexpected weight change.   HENT: Negative for sinus pressure, sinus pain, sore throat and trouble swallowing.    Respiratory: Negative for cough, shortness of breath and wheezing.    Cardiovascular: Negative for chest pain, palpitations and leg swelling.   Gastrointestinal: Negative for abdominal pain, diarrhea, nausea and vomiting.   Endocrine: Negative for cold intolerance, polydipsia, polyphagia and polyuria.   Genitourinary: Negative for difficulty urinating, flank pain and frequency.   Musculoskeletal: Negative for gait problem and joint swelling. Negative for back pain, neck pain and neck stiffness. Hip/lateral thigh pain.  Skin: Negative for color change and wound. Negative for pallor and rash.   Allergic/Immunologic: Negative for environmental allergies and food allergies.   Neurological: Negative for light-headedness, numbness and headaches.   Psychiatric/Behavioral: Negative for

## 2025-06-29 ENCOUNTER — HOSPITAL ENCOUNTER (EMERGENCY)
Age: 45
Discharge: HOME | End: 2025-06-29
Attending: EMERGENCY MEDICINE
Payer: COMMERCIAL

## 2025-06-29 ENCOUNTER — APPOINTMENT (OUTPATIENT)
Dept: CT IMAGING | Age: 45
End: 2025-06-29
Payer: COMMERCIAL

## 2025-06-29 VITALS
DIASTOLIC BLOOD PRESSURE: 84 MMHG | SYSTOLIC BLOOD PRESSURE: 124 MMHG | BODY MASS INDEX: 38.61 KG/M2 | WEIGHT: 246 LBS | OXYGEN SATURATION: 94 % | RESPIRATION RATE: 20 BRPM | HEIGHT: 67 IN | HEART RATE: 94 BPM | TEMPERATURE: 97.9 F

## 2025-06-29 DIAGNOSIS — H92.01 RIGHT EAR PAIN: Primary | ICD-10-CM

## 2025-06-29 DIAGNOSIS — H83.8X9 SEMICIRCULAR CANAL DEHISCENCE SYNDROME: ICD-10-CM

## 2025-06-29 LAB
ANION GAP SERPL CALCULATED.3IONS-SCNC: 15 MMOL/L (ref 3–16)
BASOPHILS # BLD: 0 K/UL (ref 0–0.2)
BASOPHILS NFR BLD: 0.8 %
BUN SERPL-MCNC: 9 MG/DL (ref 7–20)
CALCIUM SERPL-MCNC: 8.8 MG/DL (ref 8.3–10.6)
CHLORIDE SERPL-SCNC: 106 MMOL/L (ref 99–110)
CO2 SERPL-SCNC: 20 MMOL/L (ref 21–32)
CREAT SERPL-MCNC: 0.8 MG/DL (ref 0.6–1.1)
DEPRECATED RDW RBC AUTO: 15.8 % (ref 12.4–15.4)
EOSINOPHIL # BLD: 0.2 K/UL (ref 0–0.6)
EOSINOPHIL NFR BLD: 4.7 %
GFR SERPLBLD CREATININE-BSD FMLA CKD-EPI: >90 ML/MIN/{1.73_M2}
GLUCOSE SERPL-MCNC: 100 MG/DL (ref 70–99)
HCG SERPL QL: NEGATIVE
HCT VFR BLD AUTO: 33.4 % (ref 36–48)
HGB BLD-MCNC: 11.5 G/DL (ref 12–16)
LYMPHOCYTES # BLD: 1.6 K/UL (ref 1–5.1)
LYMPHOCYTES NFR BLD: 36.8 %
MCH RBC QN AUTO: 30.2 PG (ref 26–34)
MCHC RBC AUTO-ENTMCNC: 34.3 G/DL (ref 31–36)
MCV RBC AUTO: 88 FL (ref 80–100)
MONOCYTES # BLD: 0.3 K/UL (ref 0–1.3)
MONOCYTES NFR BLD: 7.9 %
NEUTROPHILS # BLD: 2.2 K/UL (ref 1.7–7.7)
NEUTROPHILS NFR BLD: 49.8 %
PLATELET # BLD AUTO: 186 K/UL (ref 135–450)
PMV BLD AUTO: 7.6 FL (ref 5–10.5)
POTASSIUM SERPL-SCNC: 3.9 MMOL/L (ref 3.5–5.1)
RBC # BLD AUTO: 3.8 M/UL (ref 4–5.2)
SODIUM SERPL-SCNC: 141 MMOL/L (ref 136–145)
WBC # BLD AUTO: 4.3 K/UL (ref 4–11)

## 2025-06-29 PROCEDURE — 2580000003 HC RX 258: Performed by: PHYSICIAN ASSISTANT

## 2025-06-29 PROCEDURE — 70450 CT HEAD/BRAIN W/O DYE: CPT

## 2025-06-29 PROCEDURE — 6360000002 HC RX W HCPCS: Performed by: PHYSICIAN ASSISTANT

## 2025-06-29 PROCEDURE — 70481 CT ORBIT/EAR/FOSSA W/DYE: CPT

## 2025-06-29 PROCEDURE — 96374 THER/PROPH/DIAG INJ IV PUSH: CPT

## 2025-06-29 PROCEDURE — 85025 COMPLETE CBC W/AUTO DIFF WBC: CPT

## 2025-06-29 PROCEDURE — 96375 TX/PRO/DX INJ NEW DRUG ADDON: CPT

## 2025-06-29 PROCEDURE — 96361 HYDRATE IV INFUSION ADD-ON: CPT

## 2025-06-29 PROCEDURE — 84703 CHORIONIC GONADOTROPIN ASSAY: CPT

## 2025-06-29 PROCEDURE — 6360000004 HC RX CONTRAST MEDICATION: Performed by: PHYSICIAN ASSISTANT

## 2025-06-29 PROCEDURE — 99285 EMERGENCY DEPT VISIT HI MDM: CPT

## 2025-06-29 PROCEDURE — 80048 BASIC METABOLIC PNL TOTAL CA: CPT

## 2025-06-29 RX ORDER — KETOROLAC TROMETHAMINE 30 MG/ML
30 INJECTION, SOLUTION INTRAMUSCULAR; INTRAVENOUS ONCE
Status: COMPLETED | OUTPATIENT
Start: 2025-06-29 | End: 2025-06-29

## 2025-06-29 RX ORDER — METOCLOPRAMIDE HYDROCHLORIDE 5 MG/ML
10 INJECTION INTRAMUSCULAR; INTRAVENOUS ONCE
Status: COMPLETED | OUTPATIENT
Start: 2025-06-29 | End: 2025-06-29

## 2025-06-29 RX ORDER — NAPROXEN 500 MG/1
500 TABLET ORAL 2 TIMES DAILY WITH MEALS
Qty: 14 TABLET | Refills: 0 | Status: SHIPPED | OUTPATIENT
Start: 2025-06-29 | End: 2025-07-06

## 2025-06-29 RX ORDER — 0.9 % SODIUM CHLORIDE 0.9 %
1000 INTRAVENOUS SOLUTION INTRAVENOUS ONCE
Status: COMPLETED | OUTPATIENT
Start: 2025-06-29 | End: 2025-06-29

## 2025-06-29 RX ORDER — IOPAMIDOL 755 MG/ML
75 INJECTION, SOLUTION INTRAVASCULAR
Status: COMPLETED | OUTPATIENT
Start: 2025-06-29 | End: 2025-06-29

## 2025-06-29 RX ORDER — DIPHENHYDRAMINE HYDROCHLORIDE 50 MG/ML
25 INJECTION, SOLUTION INTRAMUSCULAR; INTRAVENOUS ONCE
Status: COMPLETED | OUTPATIENT
Start: 2025-06-29 | End: 2025-06-29

## 2025-06-29 RX ORDER — MECLIZINE HYDROCHLORIDE 25 MG/1
25 TABLET ORAL 3 TIMES DAILY PRN
Qty: 15 TABLET | Refills: 0 | Status: SHIPPED | OUTPATIENT
Start: 2025-06-29 | End: 2025-07-09

## 2025-06-29 RX ADMIN — METOCLOPRAMIDE HYDROCHLORIDE 10 MG: 5 INJECTION, SOLUTION INTRAMUSCULAR; INTRAVENOUS at 02:06

## 2025-06-29 RX ADMIN — KETOROLAC TROMETHAMINE 30 MG: 30 INJECTION, SOLUTION INTRAMUSCULAR at 02:06

## 2025-06-29 RX ADMIN — DIPHENHYDRAMINE HYDROCHLORIDE 25 MG: 50 INJECTION INTRAMUSCULAR; INTRAVENOUS at 02:06

## 2025-06-29 RX ADMIN — IOPAMIDOL 75 ML: 755 INJECTION, SOLUTION INTRAVENOUS at 02:47

## 2025-06-29 RX ADMIN — SODIUM CHLORIDE 1000 ML: 0.9 INJECTION, SOLUTION INTRAVENOUS at 02:05

## 2025-06-29 ASSESSMENT — PAIN SCALES - GENERAL: PAINLEVEL_OUTOF10: 8

## 2025-06-29 ASSESSMENT — PAIN - FUNCTIONAL ASSESSMENT: PAIN_FUNCTIONAL_ASSESSMENT: 0-10

## 2025-06-29 ASSESSMENT — PAIN DESCRIPTION - PAIN TYPE: TYPE: ACUTE PAIN

## 2025-06-29 ASSESSMENT — PAIN DESCRIPTION - ORIENTATION: ORIENTATION: RIGHT

## 2025-06-29 ASSESSMENT — PAIN DESCRIPTION - LOCATION: LOCATION: EAR;HEAD

## 2025-06-29 ASSESSMENT — LIFESTYLE VARIABLES
HOW OFTEN DO YOU HAVE A DRINK CONTAINING ALCOHOL: NEVER
HOW MANY STANDARD DRINKS CONTAINING ALCOHOL DO YOU HAVE ON A TYPICAL DAY: PATIENT DOES NOT DRINK

## 2025-06-29 NOTE — ED PROVIDER NOTES
[facility] EMERGENCY DEPARTMENT  EMERGENCY DEPARTMENT ENCOUNTER        Pt Name: Ashley Dodson  MRN: [id number]  Birthdate 1980  Date of evaluation: 6/29/2025  Provider: Brenda Mcfarlane PA-C  PCP: Chaim Oswald APRN - CNP  Note Started: 2:22 AM EDT 6/29/25       I have seen and evaluated this patient with my supervising physician No att. providers found.      CHIEF COMPLAINT       Chief Complaint   Patient presents with    Headache     Patient states she has had a headache for 1 month. On antibiotics for ear infection on right side.     Ear Pain       HISTORY OF PRESENT ILLNESS: 1 or more Elements     History From: Patient  Limitations to history : None    Ashley Dodson is a 44 y.o. female who presents to the emergency department today for evaluation for concerns of a headache as well as right ear pain.  The patient reports that she started with ear pain approximately 1 month ago, she reports that she did do a telehealth visit, and was placed on antibiotics for this.  Patient reports that she continues to have pain to the right ear, and she reports that she is now having a headache to the right side.  She is currently rating her pain as an 8/10, her pain is constant she otherwise denies any known alleviating or aggravating factors.  Patient reports that her headache was gradual in onset, she thought that initially this was related to her ear pain.  She denies this being sudden onset or thunderclap in nature.  This is not worse headache of his life.  This not an atypical headache.  She has no visual changes.  No numbness, ting or weakness.  No fever or chills she is not a diabetic.  No drainage from the ear.  No cough or congestion.  No dizziness or lightheadedness no other    Nursing Notes were all reviewed and agreed with or any disagreements were addressed in the HPI.    REVIEW OF SYSTEMS :      Review of Systems   Constitutional:  Negative for activity change, appetite change,

## 2025-06-29 NOTE — DISCHARGE INSTRUCTIONS
Your scan shows that part of the bone near your ear canal is extremely thin which can lead to pain and balance issues.  While uncomfortable with it is typically not a serious finding.  It is important to follow-up with ENT.    Please return for any concern.

## 2025-06-29 NOTE — ED PROVIDER NOTES
EMERGENCY MEDICINE ATTENDING NOTE  Stephen Hernandez Jr., SANTIAGO GALVAN, AKOSUA        I personally saw the patient and made/approved the management plan and take responsibility for the patient management on Ashley Dodson.  All diagnostic, treatment, and disposition decisions were made by myself in conjunction with the advanced practice provider.  I have participated in the medical decision making and directed the treatment plan and disposition of the patient.    For further details of Ashley Dodson's emergency department encounter, please see the advanced practice provider's documentation.    I, Stephen Hernandez Jr., SANTIAGO GALVAN FAAEM, am the primary physician provider of record.      CHIEF COMPLAINT  Chief Complaint   Patient presents with    Headache     Patient states she has had a headache for 1 month. On antibiotics for ear infection on right side.     Ear Pain       BRIEF HISTORY  Ashley Dodson is a 44 y.o. female  who presents to the ED for evaluation of ear pain and headache.  This been going on for a while.  Was started on antibiotic for possible ear infection.  States despite that the pain continues to worsen.  Does feel little nauseous with it at times.  Is any injury to the area.    BRIEF/FOCUSED PHYSICAL EXAMINATION  VITAL SIGNS:    ED Triage Vitals [06/29/25 0130]   Encounter Vitals Group      BP (!) 141/75      Systolic BP Percentile       Diastolic BP Percentile       Pulse (!) 119      Respirations 18      Temp 97.9 °F (36.6 °C)      Temp Source Oral      SpO2 95 %      Weight - Scale 111.6 kg (246 lb)      Height 1.702 m (5' 7\")      Head Circumference       Peak Flow       Pain Score       Pain Loc       Pain Education       Exclude from Growth Chart      Physical Exam  Vitals and nursing note reviewed.   Constitutional:       General: She is not in acute distress.     Appearance: Normal appearance. She is not ill-appearing.   Cardiovascular:      Rate and Rhythm: Normal rate and

## 2025-08-05 ENCOUNTER — OFFICE VISIT (OUTPATIENT)
Dept: INTERNAL MEDICINE CLINIC | Age: 45
End: 2025-08-05
Payer: COMMERCIAL

## 2025-08-05 VITALS
HEART RATE: 95 BPM | DIASTOLIC BLOOD PRESSURE: 78 MMHG | BODY MASS INDEX: 39.75 KG/M2 | TEMPERATURE: 97.7 F | SYSTOLIC BLOOD PRESSURE: 122 MMHG | WEIGHT: 253.8 LBS | OXYGEN SATURATION: 98 %

## 2025-08-05 DIAGNOSIS — K59.00 CONSTIPATION, UNSPECIFIED CONSTIPATION TYPE: ICD-10-CM

## 2025-08-05 DIAGNOSIS — Z76.89 ENCOUNTER FOR WEIGHT MANAGEMENT: Primary | ICD-10-CM

## 2025-08-05 PROCEDURE — G8417 CALC BMI ABV UP PARAM F/U: HCPCS | Performed by: NURSE PRACTITIONER

## 2025-08-05 PROCEDURE — 1036F TOBACCO NON-USER: CPT | Performed by: NURSE PRACTITIONER

## 2025-08-05 PROCEDURE — 99213 OFFICE O/P EST LOW 20 MIN: CPT | Performed by: NURSE PRACTITIONER

## 2025-08-05 PROCEDURE — G8427 DOCREV CUR MEDS BY ELIG CLIN: HCPCS | Performed by: NURSE PRACTITIONER

## 2025-08-05 RX ORDER — DOCUSATE SODIUM 100 MG/1
CAPSULE, LIQUID FILLED ORAL
Qty: 90 CAPSULE | Refills: 0 | Status: SHIPPED | OUTPATIENT
Start: 2025-08-05

## 2025-08-05 RX ORDER — BUPROPION HYDROCHLORIDE 150 MG/1
150 TABLET ORAL EVERY MORNING
Qty: 60 TABLET | Refills: 0 | Status: SHIPPED | OUTPATIENT
Start: 2025-08-05

## 2025-08-05 RX ORDER — DOCUSATE SODIUM 100 MG/1
100 CAPSULE, LIQUID FILLED ORAL 2 TIMES DAILY
Qty: 60 CAPSULE | Refills: 0 | Status: SHIPPED | OUTPATIENT
Start: 2025-08-05 | End: 2025-08-05

## 2025-08-05 ASSESSMENT — ENCOUNTER SYMPTOMS
RESPIRATORY NEGATIVE: 1
GASTROINTESTINAL NEGATIVE: 1
ALLERGIC/IMMUNOLOGIC NEGATIVE: 1
EYES NEGATIVE: 1

## 2025-08-13 ENCOUNTER — TELEPHONE (OUTPATIENT)
Dept: INTERNAL MEDICINE CLINIC | Age: 45
End: 2025-08-13

## 2025-08-22 ENCOUNTER — HOSPITAL ENCOUNTER (OUTPATIENT)
Dept: MAMMOGRAPHY | Age: 45
Discharge: HOME | End: 2025-08-26
Payer: COMMERCIAL

## 2025-08-22 VITALS — BODY MASS INDEX: 32.18 KG/M2 | WEIGHT: 205 LBS | HEIGHT: 67 IN

## 2025-08-22 DIAGNOSIS — Z12.31 VISIT FOR SCREENING MAMMOGRAM: ICD-10-CM

## 2025-08-22 PROCEDURE — 77067 SCR MAMMO BI INCL CAD: CPT

## 2025-08-25 ENCOUNTER — OFFICE VISIT (OUTPATIENT)
Dept: INTERNAL MEDICINE CLINIC | Age: 45
End: 2025-08-25
Payer: COMMERCIAL

## 2025-08-25 VITALS
WEIGHT: 233 LBS | DIASTOLIC BLOOD PRESSURE: 80 MMHG | OXYGEN SATURATION: 98 % | SYSTOLIC BLOOD PRESSURE: 110 MMHG | BODY MASS INDEX: 36.49 KG/M2

## 2025-08-25 DIAGNOSIS — D50.8 IRON DEFICIENCY ANEMIA SECONDARY TO INADEQUATE DIETARY IRON INTAKE: ICD-10-CM

## 2025-08-25 DIAGNOSIS — E66.01 SEVERE OBESITY (BMI 35.0-39.9) WITH COMORBIDITY (HCC): Primary | ICD-10-CM

## 2025-08-25 DIAGNOSIS — E55.9 VITAMIN D DEFICIENCY: ICD-10-CM

## 2025-08-25 DIAGNOSIS — D50.9 IRON DEFICIENCY ANEMIA, UNSPECIFIED IRON DEFICIENCY ANEMIA TYPE: ICD-10-CM

## 2025-08-25 DIAGNOSIS — K59.00 CONSTIPATION, UNSPECIFIED CONSTIPATION TYPE: ICD-10-CM

## 2025-08-25 PROCEDURE — 1036F TOBACCO NON-USER: CPT | Performed by: INTERNAL MEDICINE

## 2025-08-25 PROCEDURE — G2211 COMPLEX E/M VISIT ADD ON: HCPCS | Performed by: INTERNAL MEDICINE

## 2025-08-25 PROCEDURE — G8417 CALC BMI ABV UP PARAM F/U: HCPCS | Performed by: INTERNAL MEDICINE

## 2025-08-25 PROCEDURE — 99214 OFFICE O/P EST MOD 30 MIN: CPT | Performed by: INTERNAL MEDICINE

## 2025-08-25 PROCEDURE — G8427 DOCREV CUR MEDS BY ELIG CLIN: HCPCS | Performed by: INTERNAL MEDICINE

## 2025-08-25 SDOH — HEALTH STABILITY: PHYSICAL HEALTH: ON AVERAGE, HOW MANY DAYS PER WEEK DO YOU ENGAGE IN MODERATE TO STRENUOUS EXERCISE (LIKE A BRISK WALK)?: 4 DAYS

## 2025-08-26 RX ORDER — ERGOCALCIFEROL 1.25 MG/1
50000 CAPSULE, LIQUID FILLED ORAL WEEKLY
Qty: 12 CAPSULE | Refills: 1 | Status: SHIPPED | OUTPATIENT
Start: 2025-08-26

## 2025-08-26 RX ORDER — DOCUSATE SODIUM 100 MG/1
CAPSULE, LIQUID FILLED ORAL
Qty: 90 CAPSULE | Refills: 0 | Status: SHIPPED | OUTPATIENT
Start: 2025-08-26

## 2025-08-26 RX ORDER — FERROUS SULFATE 325(65) MG
325 TABLET ORAL 2 TIMES DAILY
Qty: 180 TABLET | Refills: 1 | Status: SHIPPED | OUTPATIENT
Start: 2025-08-26

## (undated) DEVICE — MOUTHPIECE ENDOSCP L CTRL OPN AND SIDE PORTS DISP

## (undated) DEVICE — SOLUTION IV IRRIG WATER 500ML POUR BRL ST 2F7113

## (undated) DEVICE — FORCEPS BX L240CM WRK CHN 2.8MM STD CAP W/ NDL MIC MESH

## (undated) DEVICE — ENDOSCOPIC KIT 6X3/16 FT COLON W/ 1.1 OZ 2 GWN W/O BRSH

## (undated) DEVICE — VALVE SUCTION AIR H2O SET ORCA POD + DISP

## (undated) DEVICE — BW-412T DISP COMBO CLEANING BRUSH: Brand: SINGLE USE COMBINATION CLEANING BRUSH

## (undated) DEVICE — AIR/WATER CLEANING ADAPTER FOR OLYMPUS® GI ENDOSCOPE: Brand: BULLDOG®